# Patient Record
Sex: MALE | Race: WHITE | NOT HISPANIC OR LATINO | Employment: OTHER | URBAN - METROPOLITAN AREA
[De-identification: names, ages, dates, MRNs, and addresses within clinical notes are randomized per-mention and may not be internally consistent; named-entity substitution may affect disease eponyms.]

---

## 2021-08-17 ENCOUNTER — HISTORICAL (OUTPATIENT)
Dept: ADMINISTRATIVE | Facility: HOSPITAL | Age: 76
End: 2021-08-17

## 2021-08-18 ENCOUNTER — HISTORICAL (OUTPATIENT)
Dept: ADMINISTRATIVE | Facility: HOSPITAL | Age: 76
End: 2021-08-18

## 2021-09-06 ENCOUNTER — HISTORICAL (OUTPATIENT)
Dept: ADMINISTRATIVE | Facility: HOSPITAL | Age: 76
End: 2021-09-06

## 2021-09-22 ENCOUNTER — HISTORICAL (OUTPATIENT)
Dept: ADMINISTRATIVE | Facility: HOSPITAL | Age: 76
End: 2021-09-22

## 2021-10-11 ENCOUNTER — HISTORICAL (OUTPATIENT)
Dept: ADMINISTRATIVE | Facility: HOSPITAL | Age: 76
End: 2021-10-11

## 2021-11-09 ENCOUNTER — HISTORICAL (OUTPATIENT)
Dept: ADMINISTRATIVE | Facility: HOSPITAL | Age: 76
End: 2021-11-09

## 2022-04-07 ENCOUNTER — HISTORICAL (OUTPATIENT)
Dept: ADMINISTRATIVE | Facility: HOSPITAL | Age: 77
End: 2022-04-07

## 2022-04-24 VITALS
HEIGHT: 73 IN | BODY MASS INDEX: 29.16 KG/M2 | WEIGHT: 220 LBS | SYSTOLIC BLOOD PRESSURE: 132 MMHG | DIASTOLIC BLOOD PRESSURE: 86 MMHG

## 2022-07-12 RX ORDER — TADALAFIL 10 MG/1
10 TABLET ORAL DAILY PRN
COMMUNITY

## 2022-07-12 RX ORDER — PANTOPRAZOLE SODIUM 40 MG/1
40 TABLET, DELAYED RELEASE ORAL 2 TIMES DAILY
COMMUNITY

## 2022-07-12 RX ORDER — DICLOFENAC SODIUM 75 MG/1
75 TABLET, DELAYED RELEASE ORAL 2 TIMES DAILY
COMMUNITY

## 2022-07-12 RX ORDER — LANOLIN ALCOHOL/MO/W.PET/CERES
400 CREAM (GRAM) TOPICAL 2 TIMES DAILY
COMMUNITY

## 2022-07-12 RX ORDER — GLYBURIDE 5 MG/1
5 TABLET ORAL 2 TIMES DAILY WITH MEALS
COMMUNITY

## 2022-07-12 RX ORDER — OLMESARTAN MEDOXOMIL 40 MG/1
40 TABLET ORAL DAILY
COMMUNITY

## 2022-07-12 RX ORDER — AMLODIPINE BESYLATE 5 MG/1
5 TABLET ORAL DAILY
COMMUNITY

## 2022-07-12 RX ORDER — FOLIC ACID 0.4 MG
400 TABLET ORAL DAILY
COMMUNITY

## 2022-07-12 RX ORDER — FUROSEMIDE 20 MG/1
20 TABLET ORAL DAILY
COMMUNITY

## 2022-07-12 RX ORDER — NEBIVOLOL 10 MG/1
10 TABLET ORAL DAILY
COMMUNITY

## 2022-07-12 RX ORDER — ROSUVASTATIN CALCIUM 5 MG/1
5 TABLET, COATED ORAL DAILY
COMMUNITY

## 2022-07-12 RX ORDER — ASPIRIN 81 MG/1
81 TABLET ORAL 2 TIMES DAILY
COMMUNITY

## 2022-07-12 RX ORDER — UBIDECARENONE 30 MG
30 CAPSULE ORAL 3 TIMES DAILY
COMMUNITY

## 2022-07-12 RX ORDER — ACETAMINOPHEN 500 MG
TABLET ORAL DAILY
COMMUNITY

## 2022-07-12 RX ORDER — TESTOSTERONE CYPIONATE 200 MG/ML
INJECTION, SOLUTION INTRAMUSCULAR
COMMUNITY

## 2022-07-12 RX ORDER — ALLOPURINOL 300 MG/1
300 TABLET ORAL DAILY
COMMUNITY

## 2022-07-14 ENCOUNTER — OFFICE VISIT (OUTPATIENT)
Dept: NEUROLOGY | Facility: CLINIC | Age: 77
End: 2022-07-14
Payer: MEDICARE

## 2022-07-14 VITALS
BODY MASS INDEX: 31.68 KG/M2 | SYSTOLIC BLOOD PRESSURE: 148 MMHG | DIASTOLIC BLOOD PRESSURE: 90 MMHG | HEIGHT: 73 IN | WEIGHT: 239 LBS

## 2022-07-14 DIAGNOSIS — F09 COGNITIVE DYSFUNCTION: ICD-10-CM

## 2022-07-14 DIAGNOSIS — G91.2 IDIOPATHIC NORMAL PRESSURE HYDROCEPHALUS: ICD-10-CM

## 2022-07-14 DIAGNOSIS — G91.2 NPH (NORMAL PRESSURE HYDROCEPHALUS): Primary | ICD-10-CM

## 2022-07-14 PROCEDURE — 99213 OFFICE O/P EST LOW 20 MIN: CPT | Mod: PBBFAC | Performed by: SPECIALIST

## 2022-07-14 PROCEDURE — 99999 PR PBB SHADOW E&M-EST. PATIENT-LVL III: CPT | Mod: PBBFAC,,, | Performed by: SPECIALIST

## 2022-07-14 PROCEDURE — 99999 PR PBB SHADOW E&M-EST. PATIENT-LVL III: ICD-10-PCS | Mod: PBBFAC,,, | Performed by: SPECIALIST

## 2022-07-14 PROCEDURE — 99214 OFFICE O/P EST MOD 30 MIN: CPT | Mod: S$PBB,,, | Performed by: NURSE PRACTITIONER

## 2022-07-14 PROCEDURE — 99214 PR OFFICE/OUTPT VISIT, EST, LEVL IV, 30-39 MIN: ICD-10-PCS | Mod: S$PBB,,, | Performed by: NURSE PRACTITIONER

## 2022-07-14 RX ORDER — ACETAMINOPHEN, DIPHENHYDRAMINE HCL, PHENYLEPHRINE HCL 325; 25; 5 MG/1; MG/1; MG/1
TABLET ORAL DAILY
COMMUNITY

## 2022-07-14 NOTE — PROGRESS NOTES
Subjective:       Patient ID: Iam Montero is a 77 y.o. male.    Chief Complaint: Follow-up memory    HPI:            STM continues to be an issues for him    Shunt placement in mid 2021 with 2-3 adjustments per Dr. Moy. Last CT head was in 12/2021; patient states he felt better when shunt was set at 4 cm; asking if adjustments can be made to  shunt setting.    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  Has lost weight       ROS: as per HPI, otherwise pertinent systems review is negative          Past Medical History:   Diagnosis Date    Chronic meningitis     Cognitive disorder     Diabetes mellitus     Gait disorder     GERD (gastroesophageal reflux disease)     History of lumbar surgery     HTN (hypertension)     Hydrocephalus     08/2021    Hyperlipidemia     Ventricular premature complex        Past Surgical History:   Procedure Laterality Date    BRAIN SURGERY      shunt placed 08/2021    CERVICAL SPINE SURGERY  2007    LAMINECTOMY Right 1995    LAMINECTOMY Left 1979    VENTRICULOPERITONEAL SHUNT  08/24/2021       Family History   Problem Relation Age of Onset    Stroke Mother        Social History     Socioeconomic History    Marital status:    Tobacco Use    Smoking status: Never Smoker    Smokeless tobacco: Never Used   Substance and Sexual Activity    Alcohol use: Yes    Drug use: Never       Review of patient's allergies indicates:   Allergen Reactions    Clindamycin      Other reaction(s): Unknown    Sulfa (sulfonamide antibiotics)      Other reaction(s): Unknown    Tetracycline      Other reaction(s): Unknown    Penicillin Rash    Tobramycin Rash         Current Outpatient Medications:     allopurinoL (ZYLOPRIM) 300 MG tablet, Take 300 mg by mouth once daily., Disp: , Rfl:     amLODIPine (NORVASC) 5 MG tablet, Take 5 mg by mouth once daily., Disp: ,  "Rfl:     aspirin (ECOTRIN) 81 MG EC tablet, Take 81 mg by mouth once daily., Disp: , Rfl:     co-enzyme Q-10 30 mg capsule, Take 30 mg by mouth 3 (three) times daily., Disp: , Rfl:     diclofenac (VOLTAREN) 75 MG EC tablet, Take 75 mg by mouth 2 (two) times daily., Disp: , Rfl:     folic acid (FOLVITE) 400 MCG tablet, Take 400 mcg by mouth once daily., Disp: , Rfl:     furosemide (LASIX) 20 MG tablet, Take 20 mg by mouth 2 (two) times daily., Disp: , Rfl:     glyBURIDE (DIABETA) 5 MG tablet, Take 5 mg by mouth daily with breakfast., Disp: , Rfl:     magnesium oxide (MAG-OX) 400 mg (241.3 mg magnesium) tablet, Take 400 mg by mouth once daily., Disp: , Rfl:     nebivoloL (BYSTOLIC) 10 MG Tab, Take 10 mg by mouth once daily., Disp: , Rfl:     olmesartan (BENICAR) 40 MG tablet, Take 40 mg by mouth once daily., Disp: , Rfl:     pantoprazole (PROTONIX) 40 MG tablet, Take 40 mg by mouth once daily., Disp: , Rfl:     rosuvastatin (CRESTOR) 5 MG tablet, Take 5 mg by mouth once daily., Disp: , Rfl:     tadalafiL (CIALIS) 10 MG tablet, Take 10 mg by mouth daily as needed for Erectile Dysfunction., Disp: , Rfl:     cholecalciferol, vitamin D3, (VITAMIN D3) 50 mcg (2,000 unit) Cap capsule, Take by mouth once daily., Disp: , Rfl:     cyanocobalamin, vitamin B-12, (VITAMIN B-12) 5,000 mcg Subl, Place under the tongue once daily., Disp: , Rfl:     testosterone cypionate (DEPOTESTOTERONE CYPIONATE) 200 mg/mL injection, Inject into the muscle every 14 (fourteen) days., Disp: , Rfl:          Objective:      Exam:   /90 (BP Location: Left arm, Patient Position: Sitting, BP Method: Medium (Manual))   Ht 6' 1" (1.854 m)   Wt 108.4 kg (239 lb)   BMI 31.53 kg/m²     Physical Exam  Vitals reviewed.   Constitutional:       Appearance: Normal appearance.      Accompanied by GF of 5-6 years  HENT:      Ears:      Comments: Hearing normal   Eyes:      Extraocular Movements: Extraocular movements intact.   Cardiovascular: "      Rate and Rhythm: Normal rate and regular rhythm.   Pulmonary:      Effort: Pulmonary effort is normal.      Breath sounds: Normal breath sounds.   Musculoskeletal:         General: Normal range of motion.   Skin:     General: Skin is warm and dry.   Neurological:      General: No focal deficit present.      Mental Status: He is alert and oriented to person, place, and time.      Reflexes absent     Plantars silent  Psychiatric:         Mood and Affect: Mood normal.         Behavior: Behavior normal.       Dr. Henriquez physically present in exam room during patient encounter.     Assessment/Plan:     Problem List Items Addressed This Visit        Neuro    NPH (normal pressure hydrocephalus) - Primary    Current Assessment & Plan     Will get CT head w/o contrast  FU in 1 year          Cognitive dysfunction    Current Assessment & Plan     CT head w/o contrast  Do not rec adding med such as Memantine and or Aricept                 Ramez Gaytan, MSN, APRN, AGACNP-BC

## 2023-07-13 ENCOUNTER — OFFICE VISIT (OUTPATIENT)
Dept: NEUROLOGY | Facility: CLINIC | Age: 78
End: 2023-07-13
Payer: MEDICARE

## 2023-07-13 VITALS
WEIGHT: 239 LBS | DIASTOLIC BLOOD PRESSURE: 100 MMHG | BODY MASS INDEX: 31.68 KG/M2 | SYSTOLIC BLOOD PRESSURE: 168 MMHG | HEIGHT: 73 IN

## 2023-07-13 DIAGNOSIS — G91.2 NPH (NORMAL PRESSURE HYDROCEPHALUS): Primary | ICD-10-CM

## 2023-07-13 PROCEDURE — 99999 PR PBB SHADOW E&M-EST. PATIENT-LVL IV: CPT | Mod: PBBFAC,,, | Performed by: NURSE PRACTITIONER

## 2023-07-13 PROCEDURE — 99214 PR OFFICE/OUTPT VISIT, EST, LEVL IV, 30-39 MIN: ICD-10-PCS | Mod: S$PBB,,, | Performed by: NURSE PRACTITIONER

## 2023-07-13 PROCEDURE — 99214 OFFICE O/P EST MOD 30 MIN: CPT | Mod: PBBFAC | Performed by: NURSE PRACTITIONER

## 2023-07-13 PROCEDURE — 99999 PR PBB SHADOW E&M-EST. PATIENT-LVL IV: ICD-10-PCS | Mod: PBBFAC,,, | Performed by: NURSE PRACTITIONER

## 2023-07-13 PROCEDURE — 99214 OFFICE O/P EST MOD 30 MIN: CPT | Mod: S$PBB,,, | Performed by: NURSE PRACTITIONER

## 2023-07-13 RX ORDER — FOLIC ACID 0.8 MG
800 TABLET ORAL DAILY
COMMUNITY

## 2023-07-13 NOTE — PROGRESS NOTES
Neurology Follow up Note    Subjective:         Patient ID: Iam Montero is a 78 y.o. male.    Chief Complaint: F/U  Memory-Unsteady Gait.      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 Pt ambulates w a cane.  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 He does not use an AFO. He denies falls.    Bladder stable; denies incontinence    ROS: as per HPI, otherwise pertinent systems review is negative          Past Medical History:   Diagnosis Date    Chronic meningitis     Cognitive disorder     Diabetes mellitus     Gait disorder     GERD (gastroesophageal reflux disease)     History of lumbar surgery     HTN (hypertension)     Hydrocephalus     08/2021    Hyperlipidemia     Ventricular premature complex        Past Surgical History:   Procedure Laterality Date    BRAIN SURGERY      shunt placed 08/2021    CERVICAL SPINE SURGERY  2007    LAMINECTOMY Right 1995    LAMINECTOMY Left 1979    VENTRICULOPERITONEAL SHUNT  08/24/2021       Family History   Problem Relation Age of Onset    Stroke Mother        Social History     Socioeconomic History    Marital status:    Tobacco Use    Smoking status: Never    Smokeless tobacco: Never   Substance and Sexual Activity    Alcohol use: Yes    Drug use: Never       Review of patient's allergies indicates:   Allergen Reactions    Clindamycin      Other reaction(s): Unknown    Sulfa (sulfonamide antibiotics)      Other reaction(s): Unknown    Tetracycline      Other reaction(s): Unknown    Penicillin Rash    Tobramycin Rash       Current Outpatient Medications   Medication Instructions    allopurinoL (ZYLOPRIM) 300 mg,  "Oral, Daily    amLODIPine (NORVASC) 5 mg, Oral, Daily    aspirin (ECOTRIN) 81 mg, Oral, 2 times daily    cholecalciferol, vitamin D3, (VITAMIN D3) 50 mcg (2,000 unit) Cap capsule Oral, Daily    co-enzyme Q-10 30 mg, Oral, 3 times daily    cyanocobalamin, vitamin B-12, 5,000 mcg Subl Sublingual, Daily    diclofenac (VOLTAREN) 75 mg, Oral, 2 times daily    folic acid (FOLVITE) 400 mcg, Oral, Daily    folic acid (FOLVITE) 800 mcg, Oral, Daily    furosemide (LASIX) 20 mg, Oral, Daily    glyBURIDE (DIABETA) 5 mg, Oral, 2 times daily with meals    magnesium oxide (MAG-OX) 400 mg, Oral, 2 times daily    nebivoloL (BYSTOLIC) 10 mg, Oral, Daily    olmesartan (BENICAR) 40 mg, Oral, Daily    pantoprazole (PROTONIX) 40 mg, Oral, 2 times daily    rosuvastatin (CRESTOR) 5 mg, Oral, Daily    tadalafiL (CIALIS) 10 mg, Oral, Daily PRN    testosterone cypionate (DEPOTESTOTERONE CYPIONATE) 200 mg/mL injection Intramuscular, Every 14 days       Objective:      Exam:   Visit Vitals  BP (!) 168/100   Ht 6' 1" (1.854 m)   Wt 108.4 kg (239 lb)   BMI 31.53 kg/m²       Physical Exam  Vitals reviewed.   Constitutional:       Appearance: Normal appearance.      Accompanied by GF of 5-6 years  HENT:      Ears:      Comments: Hearing normal   Eyes:      Extraocular Movements: Extraocular movements intact.      Funduscopic exam nml - prior cataract surgery      PERRLA  Cardiovascular:      Rate and Rhythm: Normal rate and regular rhythm.   Pulmonary:      Effort: Pulmonary effort is normal.      Breath sounds: Normal breath sounds.   Musculoskeletal:         General: Normal range of motion.   Skin:     General: Skin is warm and dry.   Neurological:      General: No focal deficit present.      Mental Status: He is alert and oriented to person, place, and time.      Reflexes absent     Plantars silent     R DF 3/5     Vib nml     Floppy R ankle     Gait unassisted [did not use the cane]; steppage gait R foot; turn en bloc  Psychiatric:         Mood " and Affect: Mood normal.         Behavior: Behavior normal.       Dr. Henriquez physically present in exam room during patient encounter.         Assessment/Plan:   1. NPH (normal pressure hydrocephalus)  Continue present management  Fall risk discussed    Ask that he call us if needed to come back in the future; no FU requested at this time - he agreed    Driving discussed    Repeat brain imaging not warranted at this time being he has remained stable. His CT head 07/2022 was static stable w/o hydrocephalus and a properly functioning shunt.       Follow up if symptoms worsen or fail to improve.      Ramez Gaytan, MSN, APRN, AGACNP-BC

## 2025-07-15 ENCOUNTER — HOSPITAL ENCOUNTER (INPATIENT)
Facility: HOSPITAL | Age: 80
LOS: 3 days | Discharge: HOME | DRG: 065 | End: 2025-07-18
Attending: EMERGENCY MEDICINE | Admitting: STUDENT IN AN ORGANIZED HEALTH CARE EDUCATION/TRAINING PROGRAM
Payer: MEDICARE

## 2025-07-15 DIAGNOSIS — I10 HYPERTENSION, UNSPECIFIED TYPE: ICD-10-CM

## 2025-07-15 DIAGNOSIS — R07.9 CHEST PAIN: ICD-10-CM

## 2025-07-15 DIAGNOSIS — I61.9 INTRAPARENCHYMAL HEMORRHAGE OF BRAIN: ICD-10-CM

## 2025-07-15 DIAGNOSIS — I61.5 IVH (INTRAVENTRICULAR HEMORRHAGE): Primary | ICD-10-CM

## 2025-07-15 LAB
ALBUMIN SERPL-MCNC: 3.8 G/DL (ref 3.4–4.8)
ALBUMIN/GLOB SERPL: 1.1 RATIO (ref 1.1–2)
ALP SERPL-CCNC: 95 UNIT/L (ref 40–150)
ALT SERPL-CCNC: 23 UNIT/L (ref 0–55)
ANION GAP SERPL CALC-SCNC: 9 MEQ/L
APTT PPP: 26.4 SECONDS (ref 23.2–33.7)
AST SERPL-CCNC: 14 UNIT/L (ref 11–45)
BASOPHILS # BLD AUTO: 0.02 X10(3)/MCL
BASOPHILS NFR BLD AUTO: 0.2 %
BILIRUB SERPL-MCNC: 0.5 MG/DL
BUN SERPL-MCNC: 12.6 MG/DL (ref 8.4–25.7)
CALCIUM SERPL-MCNC: 8.7 MG/DL (ref 8.8–10)
CHLORIDE SERPL-SCNC: 106 MMOL/L (ref 98–107)
CO2 SERPL-SCNC: 25 MMOL/L (ref 23–31)
CREAT SERPL-MCNC: 0.75 MG/DL (ref 0.72–1.25)
CREAT/UREA NIT SERPL: 17
EOSINOPHIL # BLD AUTO: 0.05 X10(3)/MCL (ref 0–0.9)
EOSINOPHIL NFR BLD AUTO: 0.5 %
ERYTHROCYTE [DISTWIDTH] IN BLOOD BY AUTOMATED COUNT: 17.1 % (ref 11.5–17)
GFR SERPLBLD CREATININE-BSD FMLA CKD-EPI: >60 ML/MIN/1.73/M2
GLOBULIN SER-MCNC: 3.4 GM/DL (ref 2.4–3.5)
GLUCOSE SERPL-MCNC: 163 MG/DL (ref 82–115)
HCT VFR BLD AUTO: 47.4 % (ref 42–52)
HGB BLD-MCNC: 15.1 G/DL (ref 14–18)
IMM GRANULOCYTES # BLD AUTO: 0.06 X10(3)/MCL (ref 0–0.04)
IMM GRANULOCYTES NFR BLD AUTO: 0.6 %
INR PPP: 1
LYMPHOCYTES # BLD AUTO: 2.13 X10(3)/MCL (ref 0.6–4.6)
LYMPHOCYTES NFR BLD AUTO: 20.2 %
MCH RBC QN AUTO: 27.1 PG (ref 27–31)
MCHC RBC AUTO-ENTMCNC: 31.9 G/DL (ref 33–36)
MCV RBC AUTO: 84.9 FL (ref 80–94)
MONOCYTES # BLD AUTO: 0.91 X10(3)/MCL (ref 0.1–1.3)
MONOCYTES NFR BLD AUTO: 8.6 %
NEUTROPHILS # BLD AUTO: 7.4 X10(3)/MCL (ref 2.1–9.2)
NEUTROPHILS NFR BLD AUTO: 69.9 %
NRBC BLD AUTO-RTO: 0 %
PLATELET # BLD AUTO: 262 X10(3)/MCL (ref 130–400)
PMV BLD AUTO: 9.7 FL (ref 7.4–10.4)
POCT GLUCOSE: 193 MG/DL (ref 70–110)
POTASSIUM SERPL-SCNC: 3.6 MMOL/L (ref 3.5–5.1)
PROT SERPL-MCNC: 7.2 GM/DL (ref 5.8–7.6)
PROTHROMBIN TIME: 12.9 SECONDS (ref 12.5–14.5)
RBC # BLD AUTO: 5.58 X10(6)/MCL (ref 4.7–6.1)
SODIUM SERPL-SCNC: 140 MMOL/L (ref 136–145)
WBC # BLD AUTO: 10.57 X10(3)/MCL (ref 4.5–11.5)

## 2025-07-15 PROCEDURE — 25500020 PHARM REV CODE 255: Performed by: STUDENT IN AN ORGANIZED HEALTH CARE EDUCATION/TRAINING PROGRAM

## 2025-07-15 PROCEDURE — 85025 COMPLETE CBC W/AUTO DIFF WBC: CPT | Performed by: EMERGENCY MEDICINE

## 2025-07-15 PROCEDURE — 25000003 PHARM REV CODE 250: Performed by: STUDENT IN AN ORGANIZED HEALTH CARE EDUCATION/TRAINING PROGRAM

## 2025-07-15 PROCEDURE — 63600175 PHARM REV CODE 636 W HCPCS: Mod: JZ,TB | Performed by: EMERGENCY MEDICINE

## 2025-07-15 PROCEDURE — 96365 THER/PROPH/DIAG IV INF INIT: CPT

## 2025-07-15 PROCEDURE — 80053 COMPREHEN METABOLIC PANEL: CPT | Performed by: EMERGENCY MEDICINE

## 2025-07-15 PROCEDURE — 63600175 PHARM REV CODE 636 W HCPCS

## 2025-07-15 PROCEDURE — 63600175 PHARM REV CODE 636 W HCPCS: Performed by: STUDENT IN AN ORGANIZED HEALTH CARE EDUCATION/TRAINING PROGRAM

## 2025-07-15 PROCEDURE — 99223 1ST HOSP IP/OBS HIGH 75: CPT | Mod: FS,,, | Performed by: STUDENT IN AN ORGANIZED HEALTH CARE EDUCATION/TRAINING PROGRAM

## 2025-07-15 PROCEDURE — 85730 THROMBOPLASTIN TIME PARTIAL: CPT | Performed by: EMERGENCY MEDICINE

## 2025-07-15 PROCEDURE — C9248 INJ, CLEVIDIPINE BUTYRATE: HCPCS | Mod: JZ,TB | Performed by: EMERGENCY MEDICINE

## 2025-07-15 PROCEDURE — 85610 PROTHROMBIN TIME: CPT | Performed by: EMERGENCY MEDICINE

## 2025-07-15 PROCEDURE — 99285 EMERGENCY DEPT VISIT HI MDM: CPT | Mod: 25

## 2025-07-15 PROCEDURE — 20000000 HC ICU ROOM

## 2025-07-15 RX ORDER — GLUCAGON 1 MG
1 KIT INJECTION
Status: DISCONTINUED | OUTPATIENT
Start: 2025-07-15 | End: 2025-07-18 | Stop reason: HOSPADM

## 2025-07-15 RX ORDER — LEVETIRACETAM 500 MG/1
500 TABLET ORAL 2 TIMES DAILY
Status: DISCONTINUED | OUTPATIENT
Start: 2025-07-15 | End: 2025-07-18 | Stop reason: HOSPADM

## 2025-07-15 RX ORDER — MUPIROCIN 20 MG/G
OINTMENT TOPICAL 2 TIMES DAILY
Status: DISCONTINUED | OUTPATIENT
Start: 2025-07-15 | End: 2025-07-18 | Stop reason: HOSPADM

## 2025-07-15 RX ORDER — LABETALOL HYDROCHLORIDE 5 MG/ML
20 INJECTION, SOLUTION INTRAVENOUS EVERY 6 HOURS PRN
Status: DISCONTINUED | OUTPATIENT
Start: 2025-07-15 | End: 2025-07-18 | Stop reason: HOSPADM

## 2025-07-15 RX ORDER — INSULIN ASPART 100 [IU]/ML
0-10 INJECTION, SOLUTION INTRAVENOUS; SUBCUTANEOUS
Status: DISCONTINUED | OUTPATIENT
Start: 2025-07-15 | End: 2025-07-18 | Stop reason: HOSPADM

## 2025-07-15 RX ORDER — FOLIC ACID 0.8 MG
800 TABLET ORAL DAILY
COMMUNITY

## 2025-07-15 RX ORDER — IBUPROFEN 200 MG
16 TABLET ORAL
Status: DISCONTINUED | OUTPATIENT
Start: 2025-07-15 | End: 2025-07-18 | Stop reason: HOSPADM

## 2025-07-15 RX ORDER — HYDRALAZINE HYDROCHLORIDE 20 MG/ML
10 INJECTION INTRAMUSCULAR; INTRAVENOUS EVERY 6 HOURS PRN
Status: DISCONTINUED | OUTPATIENT
Start: 2025-07-15 | End: 2025-07-18 | Stop reason: HOSPADM

## 2025-07-15 RX ORDER — IBUPROFEN 200 MG
24 TABLET ORAL
Status: DISCONTINUED | OUTPATIENT
Start: 2025-07-15 | End: 2025-07-18 | Stop reason: HOSPADM

## 2025-07-15 RX ORDER — AMLODIPINE BESYLATE 5 MG/1
5 TABLET ORAL DAILY
Status: DISCONTINUED | OUTPATIENT
Start: 2025-07-16 | End: 2025-07-18 | Stop reason: HOSPADM

## 2025-07-15 RX ADMIN — LEVETIRACETAM 500 MG: 500 TABLET, FILM COATED ORAL at 08:07

## 2025-07-15 RX ADMIN — CLEVIPIDINE 2 MG/HR: 0.5 EMULSION INTRAVENOUS at 01:07

## 2025-07-15 RX ADMIN — IOHEXOL 100 ML: 350 INJECTION, SOLUTION INTRAVENOUS at 03:07

## 2025-07-15 RX ADMIN — MUPIROCIN: 20 OINTMENT TOPICAL at 08:07

## 2025-07-15 RX ADMIN — INSULIN ASPART 1 UNITS: 100 INJECTION, SOLUTION INTRAVENOUS; SUBCUTANEOUS at 11:07

## 2025-07-15 RX ADMIN — HYDRALAZINE HYDROCHLORIDE 10 MG: 20 INJECTION INTRAMUSCULAR; INTRAVENOUS at 05:07

## 2025-07-15 NOTE — ED PROVIDER NOTES
"Encounter Date: 7/15/2025    SCRIBE #1 NOTE: I, Alma Rosa Perea, am scribing for, and in the presence of,  Walter Camilo MD. I have scribed the following portions of the note - Other sections scribed: HPI, ROS, PE.       History     Chief Complaint   Patient presents with    Transfer     Transfer from [facility] for neurosx/left lateral ventricle temporal bleed with 2 mm shift. GCS 14 on arrival.      The patient is a 80-year-old male with a history of chronic meningitis, DM, GERD, HTN, hydrocephalus status post  shunt, and HLD [facility]. The patient complains of confusion and fatigue; he denies headaches or visual disturbances. The patient reports that he began "not feeling right" around 3 or 4 days ago, felt increasingly disoriented last night, and felt fatigued this morning, so he visited the ED; he notes that he feels like he's been unable to care for himself recently. The patient endorses issues with his memory and finding words when speaking. The patient denies recent falls, and he is not on blood thinners.     The history is provided by the patient. No  was used.     Review of patient's allergies indicates:   Allergen Reactions    Clindamycin      Other reaction(s): Unknown    Sulfa (sulfonamide antibiotics)      Other reaction(s): Unknown    Tetracycline      Other reaction(s): Unknown    Penicillin Rash    Tobramycin Rash     Past Medical History:   Diagnosis Date    Chronic meningitis     Cognitive disorder     Diabetes mellitus     Gait disorder     GERD (gastroesophageal reflux disease)     History of lumbar surgery     HTN (hypertension)     Hydrocephalus     08/2021    Hyperlipidemia     Ventricular premature complex      Past Surgical History:   Procedure Laterality Date    BRAIN SURGERY      shunt placed 08/2021    CERVICAL SPINE SURGERY  2007    LAMINECTOMY Right 1995    LAMINECTOMY Left " 1979    VENTRICULOPERITONEAL SHUNT  08/24/2021     Family History   Problem Relation Name Age of Onset    Stroke Mother       Social History[1]  Review of Systems   Constitutional:  Positive for fatigue. Negative for chills and fever.   HENT:  Negative for congestion and sore throat.    Eyes:  Negative for visual disturbance.   Respiratory:  Negative for cough and shortness of breath.    Cardiovascular:  Negative for chest pain.   Gastrointestinal:  Negative for abdominal pain, diarrhea, nausea and vomiting.   Genitourinary:  Negative for dysuria.   Musculoskeletal:  Negative for myalgias.   Skin:  Negative for rash.   Neurological:  Negative for weakness, numbness and headaches.   Psychiatric/Behavioral:  Positive for confusion.    All other systems reviewed and are negative.      Physical Exam     Initial Vitals [07/15/25 1303]   BP Pulse Resp Temp SpO2   (!) 166/87 66 18 98.3 °F (36.8 °C) 98 %      MAP       --         Physical Exam    Nursing note and vitals reviewed.  Constitutional: He appears well-developed and well-nourished.   HENT:   Head: Normocephalic and atraumatic. Mouth/Throat: Oropharynx is clear and moist.    shunt to right posterior, parietal scalp.    Eyes: Pupils are equal, round, and reactive to light.   Neck: Neck supple.   Normal range of motion.  Cardiovascular:  Normal rate, regular rhythm, normal heart sounds and intact distal pulses.           Pulmonary/Chest: Breath sounds normal. No respiratory distress.   Abdominal: Abdomen is soft. Bowel sounds are normal. There is no abdominal tenderness. There is no rebound and no guarding.   Musculoskeletal:         General: No tenderness or edema. Normal range of motion.      Cervical back: Normal range of motion and neck supple.     Neurological: He is alert and oriented to person, place, and time. He has normal strength. No sensory deficit. GCS score is 15. GCS eye subscore is 4. GCS verbal subscore is 5. GCS motor subscore is 6.   Mild  amnesia; difficulty finding words at times; right foot drop.    Skin: Skin is warm and dry. Capillary refill takes less than 2 seconds.   Psychiatric: He has a normal mood and affect.         ED Course   Procedures  Labs Reviewed   COMPREHENSIVE METABOLIC PANEL - Abnormal       Result Value    Sodium 140      Potassium 3.6      Chloride 106      CO2 25      Glucose 163 (*)     Blood Urea Nitrogen 12.6      Creatinine 0.75      Calcium 8.7 (*)     Protein Total 7.2      Albumin 3.8      Globulin 3.4      Albumin/Globulin Ratio 1.1      Bilirubin Total 0.5      ALP 95      ALT 23      AST 14      eGFR >60      Anion Gap 9.0      BUN/Creatinine Ratio 17     CBC WITH DIFFERENTIAL - Abnormal    WBC 10.57      RBC 5.58      Hgb 15.1      Hct 47.4      MCV 84.9      MCH 27.1      MCHC 31.9 (*)     RDW 17.1 (*)     Platelet 262      MPV 9.7      Neut % 69.9      Lymph % 20.2      Mono % 8.6      Eos % 0.5      Basophil % 0.2      Imm Grans % 0.6      Neut # 7.40      Lymph # 2.13      Mono # 0.91      Eos # 0.05      Baso # 0.02      Imm Gran # 0.06 (*)     NRBC% 0.0     PROTIME-INR - Normal    PT 12.9      INR 1.0      Narrative:     Protimes are used to monitor anticoagulant agents such as warfarin. PT INR values are based on the current patient normal mean and the DILEEP value for the specific instrument reagent used.  **Routine theraputic target values for the INR are 2.0-3.0**   APTT - Normal    PTT 26.4     CBC W/ AUTO DIFFERENTIAL    Narrative:     The following orders were created for panel order CBC auto differential.  Procedure                               Abnormality         Status                     ---------                               -----------         ------                     CBC with Differential[[id number]]       Abnormal            Final result                 Please view results for these tests on the individual orders.          Imaging Results    None          Medications   clevidipine (CLEVIPREX)  25 mg/50 mL infusion (4 mg/hr Intravenous Rate/Dose Change 7/15/25 1351)     Medical Decision Making  The differential diagnosis includes, but is not limited to, IVH, ICH, obstructive hydrocephalus, and hemorrhagic CVA.         Amount and/or Complexity of Data Reviewed  External Data Reviewed: radiology and notes.     Details: I personally reviewed the patient's outside CT scan showing small left lateral horn IV  Labs: ordered. Decision-making details documented in ED Course.  Discussion of management or test interpretation with external provider(s): Neurosurgery consulted  Discussed with ICU who accepts for admission    Risk  Decision regarding hospitalization.    Critical Care  Total time providing critical care: 25 minutes            Scribe Attestation:   Scribe #1: I performed the above scribed service and the documentation accurately describes the services I performed. I attest to the accuracy of the note.    Attending Attestation:           Physician Attestation for Scribe:  Physician Attestation Statement for Scribe #1: I, Walter Camilo MD, reviewed documentation, as scribed by Alma Rosa Perea in my presence, and it is both accurate and complete.             ED Course as of 07/15/25 1422   Tue Jul 15, 2025   1328 Patient primarily with memory issues difficulty finding words.  Patient's blood pressure is in the ED very high, repeat pressure was 190 so will start Cleviprex.    Neurosurgery consulted [MP]   1343 Paged Dr. Mcmahan.  [GW]   1412 Discussed with Dr. Mcmahan.  Patient has a  shunt in place, so can be admitted to the ICU for blood pressure control, recommends systolic under 160, neuro checks for repeat CT in a [MP]   1413 Discussed with ICU who accepts for admission [MP]   1413 Paged ICU. [GW]      ED Course User Index  [GW] Alma Rosa Perea  [MP] Walter Camilo MD                               Clinical Impression:  Final diagnoses:  [I61.5] IVH (intraventricular hemorrhage) (Primary)  [I10]  Hypertension, unspecified type          ED Disposition Condition    Admit                         [1]   Social History  Tobacco Use    Smoking status: Never    Smokeless tobacco: Never   Substance Use Topics    Alcohol use: Yes    Drug use: Never        Walter Camilo MD  07/15/25 1421

## 2025-07-15 NOTE — ED NOTES
Bed: 29  Expected date: 7/15/25  Expected time:   Means of arrival:   Comments:  NATO borden   guidewire removed.

## 2025-07-15 NOTE — CONSULTS
"[facility] - Emergency Dept  Neurosurgery  Consult Note    Inpatient consult to Neurosurgery  Consult performed by: Natasha Reynoso PA  Consult ordered by: Walter Camilo MD        Subjective:     Chief Complaint/Reason for Admission: [facility]    History of Present Illness: Patient is a 80-year-old male with a history of chronic meningitis, DM, GERD, HTN, hydrocephalus status post  shunt, and HLD, who presented to the ED as a transfer from an outlying facility for a left lateral ventricle temporal bleed. The patient complains of confusion and fatigue; he denies headaches or visual disturbances. The patient reports that he began "not feeling right" around 3 or 4 days ago, felt increasingly disoriented last night, and felt fatigued this morning, so he visited the ED; he notes that he feels like he's been unable to care for himself recently. The patient endorses issues with his memory and finding words when speaking. The patient denies recent falls, and he is not on blood thinners.     On PE in the ED he is sitting up in bed, NAD. He is alert and oriented but does get somewhat confused in conversation. 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 He denies falls but family at bedside states that he did have xrays of his shoulder about 3 weeks ago which she believes was due to a fall. He currently denies HA, blurred vision and N/V. He reports chronic numbness in bilateral LE following 5 back surgeries with chronic right foot drop. He also reports placement of  shunt by Dr. Moy 4 years ago following CSF leak from his lumbar procedures. He has had no issues following shunt placement.    Review of patient's allergies indicates:   Allergen Reactions    Clindamycin      Other reaction(s): Unknown    Sulfa (sulfonamide antibiotics)      Other reaction(s): Unknown    Tetracycline      Other reaction(s): Unknown    Penicillin Rash    Tobramycin Rash       Past " Medical History:   Diagnosis Date    Chronic meningitis     Cognitive disorder     Diabetes mellitus     Gait disorder     GERD (gastroesophageal reflux disease)     History of lumbar surgery     HTN (hypertension)     Hydrocephalus     08/2021    Hyperlipidemia     Ventricular premature complex      Past Surgical History:   Procedure Laterality Date    BRAIN SURGERY      shunt placed 08/2021    CERVICAL SPINE SURGERY  2007    LAMINECTOMY Right 1995    LAMINECTOMY Left 1979    VENTRICULOPERITONEAL SHUNT  08/24/2021     Family History       Problem Relation (Age of Onset)    Stroke Mother          Tobacco Use    Smoking status: Never    Smokeless tobacco: Never   Substance and Sexual Activity    Alcohol use: Yes    Drug use: Never    Sexual activity: Not on file     Review of Systems  Objective:     Weight: 90.7 kg (200 lb)  Body mass index is 27.12 kg/m².  Vital Signs (Most Recent):  Temp: 98.3 °F (36.8 °C) (07/15/25 1303)  Pulse: 73 (07/15/25 1430)  Resp: 17 (07/15/25 1430)  BP: (!) 142/77 (07/15/25 1430)  SpO2: 96 % (07/15/25 1430) Vital Signs (24h Range):  Temp:  [98.3 °F (36.8 °C)] 98.3 °F (36.8 °C)  Pulse:  [65-75] 73  Resp:  [16-25] 17  SpO2:  [93 %-98 %] 96 %  BP: (118-190)/() 142/77     Physical Exam:  Nursing note and vitals reviewed.    Constitutional: He appears well-developed and well-nourished.     Eyes: Pupils are equal, round, and reactive to light. EOM are normal.     Cardiovascular: Intact distal pulses.     Abdominal: Soft.     Skin:   Well healed shunt scar on the right scalp.     Psych/Behavior: He is alert. He is oriented to person, place, and time. He has a normal mood and affect.   He does get confused in conversation at times.     Musculoskeletal:        Neck: Range of motion is full. There is no tenderness.        Back: Range of motion is full. There is no tenderness.        Right Upper Extremities: Range of motion is full. Muscle strength is 5/5.        Left Upper Extremities: Range  "of motion is full. Muscle strength is 5/5.        Left Lower Extremities: Range of motion is full. Muscle strength is 5/5.      Comments: Right foot drop, chronic.     Neurological:        Sensory: There is no sensory deficit in the trunk. There is sensory deficit in the extremities. Sensory deficit is located Chronic numbness in bilateral LE.        Cranial nerves: Cranial nerve(s) II, III, IV, V, VI, VII, VIII, IX, X, XI and XII are intact.     Neg pronator drift  Neg clonus    Significant Labs:  Recent Labs   Lab 07/15/25  1344   *      K 3.6      CO2 25   BUN 12.6   CREATININE 0.75   CALCIUM 8.7*     Recent Labs   Lab 07/15/25  1344   WBC 10.57   HGB 15.1   HCT 47.4        Recent Labs   Lab 07/15/25  1344   INR 1.0   APTT 26.4     Microbiology Results (last 7 days)       ** No results found for the last 168 hours. **          Significant Diagnostics:    Assessment/Plan:     IPH  AMS  Confusion    He is currently GCS 15 with no c/o HA. He does have confusion in conversation and reports 3 day history of feeling "off."  CT reviewed; no plans for surgical intervention on his left IPH  We have ordered CTA head and neck and MRI brain to further assess  OK for floor with Q2 hour neuro exams if BP controlled; if not, ICU for gtt   Continue BP parameters below 160/90  Keppra BID  SCDs for DVT prophylaxis  Further recs per Dr. Mcmahan once imaging complete    Thank you for your consult. I will follow-up with patient. Please contact us if you have any additional questions.    HEMANTH Bettencourt  Neurosurgery  [facility] - Emergency Dept    "

## 2025-07-15 NOTE — PROGRESS NOTES
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 Moy and Dr. Yi), and HLD who presented to [facility] ED as a transfer from an OSH for a left lateral ventricle temporal bleed.     The patient complains of confusion and fatigue and "not feeling right" for the past 3 or 4 days ago. Has felt increasingly disoriented so went to the ED. CT Head showed L lateral ventricle temporal bleed so was transferred for neurosurgery services.     Hospital Course/Significant events:  7/15/25 - Admit to ICU    24 Hour Interval History:  On my evaluation, he is awake and alert.  He has no focal neurologic deficits.  He is confused and says that he presented to the hospital for back pain and a surgery.    Past Medical History:   Diagnosis Date    Chronic meningitis     Cognitive disorder     Diabetes mellitus     Gait disorder     GERD (gastroesophageal reflux disease)     History of lumbar surgery     HTN (hypertension)     Hydrocephalus     08/2021    Hyperlipidemia     Ventricular premature complex        Past Surgical History:   Procedure Laterality Date    BRAIN SURGERY      shunt placed 08/2021    CERVICAL SPINE SURGERY  2007    LAMINECTOMY Right 1995    LAMINECTOMY Left 1979    VENTRICULOPERITONEAL SHUNT  08/24/2021       Social History[1]        Current Outpatient Medications   Medication Instructions    allopurinoL (ZYLOPRIM) 300 mg, Oral, Daily    amLODIPine (NORVASC) 5 mg, Oral, Daily    aspirin (ECOTRIN) 81 mg, Oral, 2 times daily    co-enzyme Q-10 30 mg, Oral, 3 times daily    folic acid (FOLVITE) 800 mcg, Oral, Daily    " glyBURIDE (DIABETA) 5 mg, Oral, 2 times daily with meals, 1.5 tablets (7.5 mg) morning and night; 1 tablet (5mg) at noon    magnesium oxide (MAG-OX) 400 mg, Oral, 2 times daily    nebivoloL (BYSTOLIC) 10 mg, Oral, Daily    olmesartan (BENICAR) 40 mg, Oral, Daily    pantoprazole (PROTONIX) 80 mg, Oral, Daily    rosuvastatin (CRESTOR) 5 mg, Oral, Daily    tadalafiL (CIALIS) 10 mg, Oral, Daily PRN    testosterone cypionate (DEPOTESTOTERONE CYPIONATE) 200 mg/mL injection Intramuscular, Every 14 days       Review of patient's allergies indicates:   Allergen Reactions    Clindamycin      Other reaction(s): Unknown    Sulfa (sulfonamide antibiotics)      Other reaction(s): Unknown    Tetracycline      Other reaction(s): Unknown    Penicillin Rash    Tobramycin Rash        Current Inpatient Medications   mupirocin   Nasal BID       Current Intravenous Infusions   clevidipine  0-16 mg/hr Intravenous Continuous 6 mL/hr at 07/15/25 1521 3 mg/hr at 07/15/25 1521         ROS       Objective:     No intake or output data in the 24 hours ending 07/15/25 1618      Vital Signs (Most Recent):  Temp: 98.3 °F (36.8 °C) (07/15/25 1303)  Pulse: 73 (07/15/25 1430)  Resp: 17 (07/15/25 1430)  BP: (!) 142/77 (07/15/25 1430)  SpO2: 96 % (07/15/25 1430)  Body mass index is 27.12 kg/m².  Weight: 90.7 kg (200 lb) Vital Signs (24h Range):  Temp:  [98.3 °F (36.8 °C)] 98.3 °F (36.8 °C)  Pulse:  [65-75] 73  Resp:  [16-25] 17  SpO2:  [93 %-98 %] 96 %  BP: (118-190)/() 142/77     Physical Exam  Vitals reviewed.   Constitutional:       General: He is not in acute distress.  Cardiovascular:      Rate and Rhythm: Normal rate and regular rhythm.   Pulmonary:      Effort: Pulmonary effort is normal. No respiratory distress.      Breath sounds: No wheezing or rales.   Abdominal:      General: Abdomen is flat.      Palpations: Abdomen is soft.   Musculoskeletal:      Right lower leg: No edema.      Left lower leg: No edema.   Neurological:       General: No focal deficit present.      Mental Status: He is alert.      Comments: Alert and oriented.  Answers some questions inappropriately.  No focal neurologic deficits.  5/5 strength and sensation intact throughout           Lines/Drains/Airways       Peripheral Intravenous Line  Duration             Peripheral IV 07/15/25 1341 20 G Left Antecubital <1 day    Peripheral IV 07/15/25 1341 20 G Left;Posterior Hand <1 day                    Significant Labs:    Lab Results   Component Value Date    WBC 10.57 07/15/2025    HGB 15.1 07/15/2025    HCT 47.4 07/15/2025    MCV 84.9 07/15/2025     07/15/2025           BMP  Lab Results   Component Value Date     07/15/2025    K 3.6 07/15/2025    CO2 25 07/15/2025    BUN 12.6 07/15/2025    CREATININE 0.75 07/15/2025    CALCIUM 8.7 (L) 07/15/2025    AGAP 9.0 07/15/2025       Significant Imaging:  I have reviewed the pertinent imaging within the past 24 hours.        Assessment/Plan:     Assessment  Intraparenchymal hemorrhage  Encephalopathy  Hypertension  T2DM      Plan  Admit to ICU  Q.2h neuro checks  Blood pressure goals less than 160/90  Keppra b.i.d. for prophylaxis  Appreciate Neurosurgery recommendations  Sliding scale insulin    DVT Prophylaxis: SCD  GI Prophylaxis: none        Tyler Nuñez MD  Pulmonary Critical Care Medicine  [facility] - Emergency Dept  DOS: 07/15/2025          [1]   Social History  Socioeconomic History    Marital status:    Tobacco Use    Smoking status: Never    Smokeless tobacco: Never   Substance and Sexual Activity    Alcohol use: Yes    Drug use: Never

## 2025-07-16 PROBLEM — I61.9 INTRAPARENCHYMAL HEMORRHAGE OF BRAIN: Status: ACTIVE | Noted: 2025-07-16

## 2025-07-16 PROBLEM — R41.3 MEMORY DEFICIT: Status: ACTIVE | Noted: 2025-07-16

## 2025-07-16 LAB
CREAT SERPL-MCNC: 0.78 MG/DL (ref 0.72–1.25)
GFR SERPLBLD CREATININE-BSD FMLA CKD-EPI: >60 ML/MIN/1.73/M2
POCT GLUCOSE: 188 MG/DL (ref 70–110)
POCT GLUCOSE: 192 MG/DL (ref 70–110)
POCT GLUCOSE: 197 MG/DL (ref 70–110)
POCT GLUCOSE: 224 MG/DL (ref 70–110)

## 2025-07-16 PROCEDURE — 36415 COLL VENOUS BLD VENIPUNCTURE: CPT

## 2025-07-16 PROCEDURE — 25500020 PHARM REV CODE 255

## 2025-07-16 PROCEDURE — 11000001 HC ACUTE MED/SURG PRIVATE ROOM

## 2025-07-16 PROCEDURE — 63600175 PHARM REV CODE 636 W HCPCS: Performed by: STUDENT IN AN ORGANIZED HEALTH CARE EDUCATION/TRAINING PROGRAM

## 2025-07-16 PROCEDURE — 25000003 PHARM REV CODE 250: Performed by: STUDENT IN AN ORGANIZED HEALTH CARE EDUCATION/TRAINING PROGRAM

## 2025-07-16 PROCEDURE — 82565 ASSAY OF CREATININE: CPT

## 2025-07-16 PROCEDURE — A9577 INJ MULTIHANCE: HCPCS

## 2025-07-16 PROCEDURE — 99223 1ST HOSP IP/OBS HIGH 75: CPT | Mod: ,,, | Performed by: PSYCHIATRY & NEUROLOGY

## 2025-07-16 PROCEDURE — 87040 BLOOD CULTURE FOR BACTERIA: CPT

## 2025-07-16 RX ORDER — IBUPROFEN 200 MG
16 TABLET ORAL
Status: DISCONTINUED | OUTPATIENT
Start: 2025-07-16 | End: 2025-07-18 | Stop reason: HOSPADM

## 2025-07-16 RX ORDER — IBUPROFEN 200 MG
24 TABLET ORAL
Status: DISCONTINUED | OUTPATIENT
Start: 2025-07-16 | End: 2025-07-18 | Stop reason: HOSPADM

## 2025-07-16 RX ORDER — GLUCAGON 1 MG
1 KIT INJECTION
Status: DISCONTINUED | OUTPATIENT
Start: 2025-07-16 | End: 2025-07-18 | Stop reason: HOSPADM

## 2025-07-16 RX ORDER — NALOXONE HCL 0.4 MG/ML
0.02 VIAL (ML) INJECTION
Status: DISCONTINUED | OUTPATIENT
Start: 2025-07-16 | End: 2025-07-18 | Stop reason: HOSPADM

## 2025-07-16 RX ORDER — SODIUM CHLORIDE 0.9 % (FLUSH) 0.9 %
10 SYRINGE (ML) INJECTION EVERY 12 HOURS PRN
Status: DISCONTINUED | OUTPATIENT
Start: 2025-07-16 | End: 2025-07-18 | Stop reason: HOSPADM

## 2025-07-16 RX ADMIN — GADOBENATE DIMEGLUMINE 18 ML: 529 INJECTION, SOLUTION INTRAVENOUS at 01:07

## 2025-07-16 RX ADMIN — INSULIN ASPART 2 UNITS: 100 INJECTION, SOLUTION INTRAVENOUS; SUBCUTANEOUS at 12:07

## 2025-07-16 RX ADMIN — INSULIN ASPART 2 UNITS: 100 INJECTION, SOLUTION INTRAVENOUS; SUBCUTANEOUS at 07:07

## 2025-07-16 RX ADMIN — MUPIROCIN: 20 OINTMENT TOPICAL at 08:07

## 2025-07-16 RX ADMIN — MUPIROCIN: 20 OINTMENT TOPICAL at 09:07

## 2025-07-16 RX ADMIN — AMLODIPINE BESYLATE 5 MG: 5 TABLET ORAL at 07:07

## 2025-07-16 RX ADMIN — LEVETIRACETAM 500 MG: 500 TABLET, FILM COATED ORAL at 08:07

## 2025-07-16 RX ADMIN — LEVETIRACETAM 500 MG: 500 TABLET, FILM COATED ORAL at 07:07

## 2025-07-16 NOTE — PLAN OF CARE
07/16/25 1200   Discharge Assessment   Assessment Type Discharge Planning Assessment   Confirmed/corrected address, phone number and insurance Yes   Source of Information patient   Communicated JOVANI with patient/caregiver Date not available/Unable to determine   People in Home significant other   Name(s) of People in Home debra Ramos friend [phone]   Do you expect to return to your current living situation? Yes   Do you have help at home or someone to help you manage your care at home? Yes   Who are your caregiver(s) and their phone number(s)? debra Ramos friend [phone]   Prior to hospitilization cognitive status: Alert/Oriented   Current cognitive status: Alert/Oriented   Walking or Climbing Stairs Difficulty no   Dressing/Bathing Difficulty no   Equipment Currently Used at Home blood pressure machine;shower chair;grab bar   Readmission within 30 days? No   Patient currently being followed by outpatient case management? No   Do you currently have service(s) that help you manage your care at home? No   Do you take prescription medications? Yes   Do you have prescription coverage? Yes   Do you have any problems affording any of your prescribed medications? No   Is the patient taking medications as prescribed? yes   Who is going to help you get home at discharge? debra Ramos friend [phone]   How do you get to doctors appointments? car, drives self   Are you on dialysis? No   Do you take coumadin? No   Discharge Plan A Home with family   Discharge Plan B Home with family   DME Needed Upon Discharge  none   Discharge Plan discussed with: Patient;Adult children;Friend   Name(s) and Number(s) debra Ramos friend [phone]   Transition of Care Barriers None   Physical Activity   On average, how many days per week do you engage in moderate to strenuous exercise (like a brisk walk)? 0 days   On average, how many minutes do you engage in exercise at this level? 0 min   Financial Resource Strain    How hard is it for you to pay for the very basics like food, housing, medical care, and heating? Not very   Housing Stability   In the last 12 months, was there a time when you were not able to pay the mortgage or rent on time? N   At any time in the past 12 months, were you homeless or living in a shelter (including now)? N   Transportation Needs   In the past 12 months, has lack of transportation kept you from medical appointments or from getting medications? no   In the past 12 months, has lack of transportation kept you from meetings, work, or from getting things needed for daily living? No   Food Insecurity   Within the past 12 months, you worried that your food would run out before you got the money to buy more. Never true   Within the past 12 months, the food you bought just didn't last and you didn't have money to get more. Never true   Stress   Do you feel stress - tense, restless, nervous, or anxious, or unable to sleep at night because your mind is troubled all the time - these days? Not at all   Social Isolation   How often do you feel lonely or isolated from those around you?  Never   Alcohol Use   Q1: How often do you have a drink containing alcohol? Monthly or l   Utilities   In the past 12 months has the electric, gas, oil, or water company threatened to shut off services in your home? No   Health Literacy   How often do you need to have someone help you when you read instructions, pamphlets, or other written material from your doctor or pharmacy? Sometimes

## 2025-07-16 NOTE — CONSULTS
[facility] - [facility] ICU  Neurology  Consult Note    Patient Name: Iam Montero  MRN: [id number]  Admission Date: 7/15/2025  Hospital Length of Stay: 1 days  Code Status: Full Code   Attending Provider: Villa Acosta,Mary   Consulting Provider: MELONY Alaniz  Primary Care Physician: Walter Paul MD  Principal Problem:<principal problem not specified>    Inpatient consult to Neurology Services (Vascular Neurology)  Consult performed by: Jrodyn Caicedo FNP  Consult ordered by: Villa Acosta MD         Subjective:     Chief Complaint:    Chief Complaint   Patient presents with    Transfer     Transfer from [facility] for neurosx/left lateral ventricle temporal bleed with 2 mm shift. GCS 14 on arrival.      HPI:   80-year-old male with PMH DM2, GERD, HTN, NPH s/p  shunt, and [facility] services d/t L lateral ventricle temporal bleed.  Patient evaluated by Neurosurgery, no plans for surgical intervention.   On exam patient noted to be alert and oriented, however, noted to have some confusion in conversation for which general neurology has been consulted.    CTA h/n unrevealing for interval change to previously seen L temporal IPH, no LVO or flow-limiting stenosis.  Interventional neurology team consulted for DSA per NSGY given the atypical nature of the IPH.      Past Medical History:   Diagnosis Date    Chronic meningitis     Cognitive disorder     Diabetes mellitus     Gait disorder     GERD (gastroesophageal reflux disease)     History of lumbar surgery     HTN (hypertension)     Hydrocephalus     08/2021    Hyperlipidemia     Ventricular premature complex        Past Surgical History:   Procedure Laterality Date    BRAIN SURGERY      shunt placed 08/2021    CERVICAL SPINE SURGERY  2007    LAMINECTOMY Right 1995    LAMINECTOMY Left 1979    VENTRICULOPERITONEAL SHUNT  08/24/2021       Review of patient's allergies  indicates:   Allergen Reactions    Clindamycin      Other reaction(s): Unknown    Sulfa (sulfonamide antibiotics)      Other reaction(s): Unknown    Tetracycline      Other reaction(s): Unknown    Penicillin Rash    Tobramycin Rash       Current Neurological Medications:      No current facility-administered medications on file prior to encounter.     Current Outpatient Medications on File Prior to Encounter   Medication Sig    allopurinoL (ZYLOPRIM) 300 MG tablet Take 300 mg by mouth once daily.    amLODIPine (NORVASC) 5 MG tablet Take 5 mg by mouth once daily.    aspirin (ECOTRIN) 81 MG EC tablet Take 81 mg by mouth 2 (two) times a day.    co-enzyme Q-10 30 mg capsule Take 30 mg by mouth 3 (three) times daily.    folic acid (FOLVITE) 800 MCG Tab Take 800 mcg by mouth once daily.    glyBURIDE (DIABETA) 5 MG tablet Take 5 mg by mouth 2 (two) times daily with meals. 1.5 tablets (7.5 mg) morning and night; 1 tablet (5mg) at noon    magnesium oxide (MAG-OX) 400 mg (241.3 mg magnesium) tablet Take 400 mg by mouth 2 (two) times daily.    nebivoloL (BYSTOLIC) 10 MG Tab Take 10 mg by mouth once daily.    olmesartan (BENICAR) 40 MG tablet Take 40 mg by mouth once daily.    pantoprazole (PROTONIX) 40 MG tablet Take 80 mg by mouth once daily.    rosuvastatin (CRESTOR) 5 MG tablet Take 5 mg by mouth once daily.    tadalafiL (CIALIS) 10 MG tablet Take 10 mg by mouth daily as needed for Erectile Dysfunction.    testosterone cypionate (DEPOTESTOTERONE CYPIONATE) 200 mg/mL injection Inject into the muscle every 14 (fourteen) days.     Family History       Problem Relation (Age of Onset)    Stroke Mother          Tobacco Use    Smoking status: Never    Smokeless tobacco: Never   Substance and Sexual Activity    Alcohol use: Yes    Drug use: Never    Sexual activity: Not on file     Review of Systems   Eyes:  Negative for photophobia and visual disturbance.   Gastrointestinal:  Negative for nausea and vomiting.   Musculoskeletal:   Negative for neck pain and neck stiffness.   Neurological:  Negative for dizziness, tremors, seizures, syncope, facial asymmetry, speech difficulty, weakness, light-headedness, numbness and headaches.   Psychiatric/Behavioral:  Positive for confusion (some mild memory issues). Negative for agitation and behavioral problems.      Objective:     Vital Signs (Most Recent):  Temp: 98.6 °F (37 °C) (07/16/25 1200)  Pulse: 67 (07/16/25 1249)  Resp: (!) 23 (07/16/25 1249)  BP: (!) 137/98 (07/16/25 1234)  SpO2: 95 % (07/16/25 1249) Vital Signs (24h Range):  Temp:  [97.9 °F (36.6 °C)-99.2 °F (37.3 °C)] 98.6 °F (37 °C)  Pulse:  [61-78] 67  Resp:  [6-27] 23  SpO2:  [91 %-97 %] 95 %  BP: (118-174)/(55-98) 137/98     Weight: 90.7 kg (200 lb)  Body mass index is 27.12 kg/m².     Physical Exam  Vitals and nursing note reviewed.   Constitutional:       General: He is not in acute distress.     Appearance: Normal appearance. He is normal weight. He is not ill-appearing or toxic-appearing.   HENT:      Head: Normocephalic and atraumatic.      Mouth/Throat:      Mouth: Mucous membranes are moist.   Eyes:      General: No visual field deficit.     Extraocular Movements: Extraocular movements intact.      Pupils: Pupils are equal, round, and reactive to light.   Pulmonary:      Effort: Pulmonary effort is normal. No respiratory distress.   Musculoskeletal:         General: Normal range of motion.      Right lower leg: No edema.      Left lower leg: No edema.   Skin:     General: Skin is warm and dry.      Capillary Refill: Capillary refill takes less than 2 seconds.      Findings: No lesion.   Neurological:      General: No focal deficit present.      Mental Status: He is alert and oriented to person, place, and time.      GCS: GCS eye subscore is 4. GCS verbal subscore is 5. GCS motor subscore is 6.      Cranial Nerves: No cranial nerve deficit, dysarthria or facial asymmetry.      Sensory: No sensory deficit.      Motor: No weakness,  tremor, atrophy, abnormal muscle tone, seizure activity or pronator drift.   Psychiatric:         Mood and Affect: Mood normal.         Behavior: Behavior normal.          NEUROLOGICAL EXAMINATION:     MENTAL STATUS   Oriented to person, place, and time.     CRANIAL NERVES     CN III, IV, VI   Pupils are equal, round, and reactive to light.      Significant Labs: CBC:   Recent Labs   Lab 07/15/25  1344   WBC 10.57   HGB 15.1   HCT 47.4        CMP:   Recent Labs   Lab 07/15/25  1344 07/16/25  1009   *  --      --    K 3.6  --      --    CO2 25  --    BUN 12.6  --    CREATININE 0.75 0.78   CALCIUM 8.7*  --    PROT 7.2  --    ALBUMIN 3.8  --    BILITOT 0.5  --    ALKPHOS 95  --    AST 14  --    ALT 23  --      All pertinent lab results from the past 24 hours have been reviewed.    Significant Imaging: I have reviewed all pertinent imaging results/findings within the past 24 hours.  Assessment and Plan:     Intraparenchymal hemorrhage of brain  - presented with confusion, fatigue, memory issues, word finding difficulty, increased disorientation; patient presented to the ED as a transfer from an outlying facility for a left lateral ventricle temporal bleed     Imaging:  -CTh: A ventriculostomy drain through a right parietal approach is again seen in stable position with its tip located within the frontal horn of the right lateral ventricle. There is a slight decrease in the intraventricular hemorrhage seen within the posterior and temporal horns of the left lateral ventricle with similar degree of minimal transependymal edema. No new hemorrhagic focus identified.   CTA head/neck: 1. No significant short interval change of previously seen left temporal intraparenchymal hemorrhage.  2. Intracranial atherosclerotic changes without evidence of large vessel occlusion.  3. Mild scattered atherosclerotic changes of the cervical vasculature without flow limiting stenosis.    MRI brain w/ w/o: 1. Stable  left hippocampal parenchymal hemorrhage with intraventricular extension.  No definite suspicious nodular enhancement.  2. Subacute appearing ischemic changes in the left hippocampal body.  3. Smooth diffuse dural thickening is likely related to shunted ventricles.    Plan:  -NSGY following and has requested interventional neuro consult for possible DSA given atypical nature of the bleeding  -patient unsure if he would like to proceed with DSA, requesting to speak to MD.   -he agreed to NPO at midnight for potential diagnostic cerebral angio tomorrow after discussion with MD  -other recommendations per primary and neurosurgery teams     Further recs per MD.       Jordyn Caicedo, FNP  Neurology  [facility] - [facility] ICU

## 2025-07-16 NOTE — HPI
80-year-old male with PMH DM2, GERD, HTN, NPH s/p  shunt, and HLD transferred from [facility] on 07/15 for neurosurgery services d/t L lateral ventricle temporal bleed.  Patient evaluated by Neurosurgery, no plans for surgical intervention.   On exam patient noted to be alert and oriented, however, noted to have some confusion in conversation for which general neurology has been consulted.    CTA h/n unrevealing for interval change to previously seen L temporal IPH, no LVO or flow-limiting stenosis.  Interventional neurology team consulted for DSA per NSGY given the atypical nature of the IPH.

## 2025-07-16 NOTE — SUBJECTIVE & OBJECTIVE
Past Medical History:   Diagnosis Date    Chronic meningitis     Cognitive disorder     Diabetes mellitus     Gait disorder     GERD (gastroesophageal reflux disease)     History of lumbar surgery     HTN (hypertension)     Hydrocephalus     08/2021    Hyperlipidemia     Ventricular premature complex        Past Surgical History:   Procedure Laterality Date    BRAIN SURGERY      shunt placed 08/2021    CERVICAL SPINE SURGERY  2007    LAMINECTOMY Right 1995    LAMINECTOMY Left 1979    VENTRICULOPERITONEAL SHUNT  08/24/2021       Review of patient's allergies indicates:   Allergen Reactions    Clindamycin      Other reaction(s): Unknown    Sulfa (sulfonamide antibiotics)      Other reaction(s): Unknown    Tetracycline      Other reaction(s): Unknown    Penicillin Rash    Tobramycin Rash       Current Neurological Medications:      No current facility-administered medications on file prior to encounter.     Current Outpatient Medications on File Prior to Encounter   Medication Sig    allopurinoL (ZYLOPRIM) 300 MG tablet Take 300 mg by mouth once daily.    amLODIPine (NORVASC) 5 MG tablet Take 5 mg by mouth once daily.    aspirin (ECOTRIN) 81 MG EC tablet Take 81 mg by mouth 2 (two) times a day.    co-enzyme Q-10 30 mg capsule Take 30 mg by mouth 3 (three) times daily.    folic acid (FOLVITE) 800 MCG Tab Take 800 mcg by mouth once daily.    glyBURIDE (DIABETA) 5 MG tablet Take 5 mg by mouth 2 (two) times daily with meals. 1.5 tablets (7.5 mg) morning and night; 1 tablet (5mg) at noon    magnesium oxide (MAG-OX) 400 mg (241.3 mg magnesium) tablet Take 400 mg by mouth 2 (two) times daily.    nebivoloL (BYSTOLIC) 10 MG Tab Take 10 mg by mouth once daily.    olmesartan (BENICAR) 40 MG tablet Take 40 mg by mouth once daily.    pantoprazole (PROTONIX) 40 MG tablet Take 80 mg by mouth once daily.    rosuvastatin (CRESTOR) 5 MG tablet Take 5 mg by mouth once daily.    tadalafiL (CIALIS) 10 MG tablet Take 10 mg by mouth daily as  needed for Erectile Dysfunction.    testosterone cypionate (DEPOTESTOTERONE CYPIONATE) 200 mg/mL injection Inject into the muscle every 14 (fourteen) days.     Family History       Problem Relation (Age of Onset)    Stroke Mother          Tobacco Use    Smoking status: Never    Smokeless tobacco: Never   Substance and Sexual Activity    Alcohol use: Yes    Drug use: Never    Sexual activity: Not on file     Review of Systems   Eyes:  Negative for photophobia and visual disturbance.   Gastrointestinal:  Negative for nausea and vomiting.   Musculoskeletal:  Negative for neck pain and neck stiffness.   Neurological:  Negative for dizziness, tremors, seizures, syncope, facial asymmetry, speech difficulty, weakness, light-headedness, numbness and headaches.   Psychiatric/Behavioral:  Positive for confusion (some mild memory issues). Negative for agitation and behavioral problems.      Objective:     Vital Signs (Most Recent):  Temp: 98.6 °F (37 °C) (07/16/25 1200)  Pulse: 67 (07/16/25 1249)  Resp: (!) 23 (07/16/25 1249)  BP: (!) 137/98 (07/16/25 1234)  SpO2: 95 % (07/16/25 1249) Vital Signs (24h Range):  Temp:  [97.9 °F (36.6 °C)-99.2 °F (37.3 °C)] 98.6 °F (37 °C)  Pulse:  [61-78] 67  Resp:  [6-27] 23  SpO2:  [91 %-97 %] 95 %  BP: (118-174)/(55-98) 137/98     Weight: 90.7 kg (200 lb)  Body mass index is 27.12 kg/m².     Physical Exam  Vitals and nursing note reviewed.   Constitutional:       General: He is not in acute distress.     Appearance: Normal appearance. He is normal weight. He is not ill-appearing or toxic-appearing.   HENT:      Head: Normocephalic and atraumatic.      Mouth/Throat:      Mouth: Mucous membranes are moist.   Eyes:      General: No visual field deficit.     Extraocular Movements: Extraocular movements intact.      Pupils: Pupils are equal, round, and reactive to light.   Pulmonary:      Effort: Pulmonary effort is normal. No respiratory distress.   Musculoskeletal:         General: Normal range  of motion.      Right lower leg: No edema.      Left lower leg: No edema.   Skin:     General: Skin is warm and dry.      Capillary Refill: Capillary refill takes less than 2 seconds.      Findings: No lesion.   Neurological:      General: No focal deficit present.      Mental Status: He is alert and oriented to person, place, and time.      GCS: GCS eye subscore is 4. GCS verbal subscore is 5. GCS motor subscore is 6.      Cranial Nerves: No cranial nerve deficit, dysarthria or facial asymmetry.      Sensory: No sensory deficit.      Motor: No weakness, tremor, atrophy, abnormal muscle tone, seizure activity or pronator drift.   Psychiatric:         Mood and Affect: Mood normal.         Behavior: Behavior normal.          NEUROLOGICAL EXAMINATION:     MENTAL STATUS   Oriented to person, place, and time.     CRANIAL NERVES     CN III, IV, VI   Pupils are equal, round, and reactive to light.      Significant Labs: CBC:   Recent Labs   Lab 07/15/25  1344   WBC 10.57   HGB 15.1   HCT 47.4        CMP:   Recent Labs   Lab 07/15/25  1344 07/16/25  1009   *  --      --    K 3.6  --      --    CO2 25  --    BUN 12.6  --    CREATININE 0.75 0.78   CALCIUM 8.7*  --    PROT 7.2  --    ALBUMIN 3.8  --    BILITOT 0.5  --    ALKPHOS 95  --    AST 14  --    ALT 23  --      All pertinent lab results from the past 24 hours have been reviewed.    Significant Imaging: I have reviewed all pertinent imaging results/findings within the past 24 hours.

## 2025-07-16 NOTE — ASSESSMENT & PLAN NOTE
- presented with confusion, fatigue, memory issues, word finding difficulty, increased disorientation; patient presented to the ED as a transfer from an outlying facility for a left lateral ventricle temporal bleed     Imaging:  -CTh: A ventriculostomy drain through a right parietal approach is again seen in stable position with its tip located within the frontal horn of the right lateral ventricle. There is a slight decrease in the intraventricular hemorrhage seen within the posterior and temporal horns of the left lateral ventricle with similar degree of minimal transependymal edema. No new hemorrhagic focus identified.   CTA head/neck: 1. No significant short interval change of previously seen left temporal intraparenchymal hemorrhage.  2. Intracranial atherosclerotic changes without evidence of large vessel occlusion.  3. Mild scattered atherosclerotic changes of the cervical vasculature without flow limiting stenosis.    MRI brain w/ w/o: 1. Stable left hippocampal parenchymal hemorrhage with intraventricular extension.  No definite suspicious nodular enhancement.  2. Subacute appearing ischemic changes in the left hippocampal body.  3. Smooth diffuse dural thickening is likely related to shunted ventricles.    Plan:  -NSGY following and has requested interventional neuro consult for possible DSA given atypical nature of the bleeding  -patient unsure if he would like to proceed with DSA, requesting to speak to MD.   -he agreed to NPO at midnight for potential diagnostic cerebral angio tomorrow after discussion with MD  -other recommendations per primary and neurosurgery teams     Further recs per MD.

## 2025-07-16 NOTE — CONSULTS
[facility] - [facility] ICU  Neurology  Consult Note    Patient Name: Iam Montero  MRN: [id number]  Admission Date: 7/15/2025  Hospital Length of Stay: 1 days  Code Status: Full Code   Attending Provider: Villa Acosta,*   Consulting Provider: SANYA Marrero  Primary Care Physician: Walter Paul MD  Principal Problem:<principal problem not specified>    Inpatient Consult to Neurology Services (General Neurology)  Consult performed by: Ysabel Glass AGACNP-BC  Consult ordered by: Tyler Nuñez MD         Subjective:     Chief Complaint:       HPI:   80-year-old male with PMH DM2, GERD, HTN, NPH s/p  shunt, and HLD transferred from [facility] facility on 07/15 for neurosurgery services d/t L lateral ventricle temporal bleed.  Patient evaluated by Neurosurgery, no plans for surgical intervention.   On exam patient noted to be alert and oriented, however, noted to have some confusion in conversation for which general neurology has been consulted.  Of note, patient and they are retired internal medicine doctor.    CTA h/n unrevealing for interval change to previously seen L temporal IPH, no LVO or flow-limiting stenosis.     Past Medical History:   Diagnosis Date    Chronic meningitis     Cognitive disorder     Diabetes mellitus     Gait disorder     GERD (gastroesophageal reflux disease)     History of lumbar surgery     HTN (hypertension)     Hydrocephalus     08/2021    Hyperlipidemia     Ventricular premature complex        Past Surgical History:   Procedure Laterality Date    BRAIN SURGERY      shunt placed 08/2021    CERVICAL SPINE SURGERY  2007    LAMINECTOMY Right 1995    LAMINECTOMY Left 1979    VENTRICULOPERITONEAL SHUNT  08/24/2021       Review of patient's allergies indicates:   Allergen Reactions    Clindamycin      Other reaction(s): Unknown    Sulfa (sulfonamide antibiotics)      Other reaction(s): Unknown    Tetracycline      Other reaction(s): Unknown    Penicillin  Rash    Tobramycin Rash       Current Neurological Medications:     No current facility-administered medications on file prior to encounter.     Current Outpatient Medications on File Prior to Encounter   Medication Sig    allopurinoL (ZYLOPRIM) 300 MG tablet Take 300 mg by mouth once daily.    amLODIPine (NORVASC) 5 MG tablet Take 5 mg by mouth once daily.    aspirin (ECOTRIN) 81 MG EC tablet Take 81 mg by mouth 2 (two) times a day.    co-enzyme Q-10 30 mg capsule Take 30 mg by mouth 3 (three) times daily.    folic acid (FOLVITE) 800 MCG Tab Take 800 mcg by mouth once daily.    glyBURIDE (DIABETA) 5 MG tablet Take 5 mg by mouth 2 (two) times daily with meals. 1.5 tablets (7.5 mg) morning and night; 1 tablet (5mg) at noon    magnesium oxide (MAG-OX) 400 mg (241.3 mg magnesium) tablet Take 400 mg by mouth 2 (two) times daily.    nebivoloL (BYSTOLIC) 10 MG Tab Take 10 mg by mouth once daily.    olmesartan (BENICAR) 40 MG tablet Take 40 mg by mouth once daily.    pantoprazole (PROTONIX) 40 MG tablet Take 80 mg by mouth once daily.    rosuvastatin (CRESTOR) 5 MG tablet Take 5 mg by mouth once daily.    tadalafiL (CIALIS) 10 MG tablet Take 10 mg by mouth daily as needed for Erectile Dysfunction.    testosterone cypionate (DEPOTESTOTERONE CYPIONATE) 200 mg/mL injection Inject into the muscle every 14 (fourteen) days.     Family History       Problem Relation (Age of Onset)    Stroke Mother          Tobacco Use    Smoking status: Never    Smokeless tobacco: Never   Substance and Sexual Activity    Alcohol use: Yes    Drug use: Never    Sexual activity: Not on file     Review of Systems  A 14pt ros was reviewed & is negative unless o/w documented in the hpi    Objective:     Vital Signs (Most Recent):  Temp: 98.6 °F (37 °C) (07/16/25 1200)  Pulse: 67 (07/16/25 1249)  Resp: (!) 23 (07/16/25 1249)  BP: (!) 137/98 (07/16/25 1234)  SpO2: 95 % (07/16/25 1249) Vital Signs (24h Range):  Temp:  [97.9 °F (36.6 °C)-99.2 °F (37.3 °C)]  98.6 °F (37 °C)  Pulse:  [61-78] 67  Resp:  [6-27] 23  SpO2:  [91 %-98 %] 95 %  BP: (118-190)/() 137/98     Weight: 90.7 kg (200 lb)  Body mass index is 27.12 kg/m².     Physical Exam  Vitals reviewed.   Constitutional:       General: He is awake.   HENT:      Head: Normocephalic and atraumatic.      Nose: Nose normal.      Mouth/Throat:      Mouth: Mucous membranes are moist.      Pharynx: Oropharynx is clear.   Eyes:      Extraocular Movements: Extraocular movements intact and EOM normal.      Pupils: Pupils are equal, round, and reactive to light.   Pulmonary:      Effort: Pulmonary effort is normal.   Musculoskeletal:         General: Normal range of motion.      Cervical back: Normal range of motion.   Skin:     General: Skin is warm and dry.   Neurological:      Mental Status: He is alert and oriented to person, place, and time.   Psychiatric:         Speech: Speech normal.         Behavior: Behavior is cooperative.         Cognition and Memory: Memory is impaired. He exhibits impaired recent memory and impaired remote memory.         Judgment: Judgment is inappropriate.          NEUROLOGICAL EXAMINATION:     MENTAL STATUS   Oriented to person, place, and time.   Follows 1 step commands.   Speech: speech is normal   Level of consciousness: alert  Knowledge: poor and inconsistent with education.     CRANIAL NERVES     CN II   Visual fields full to confrontation.     CN III, IV, VI   Pupils are equal, round, and reactive to light.  Extraocular motions are normal.   Nystagmus: none   Conjugate gaze: present    MOTOR EXAM        Opposes gravity with all extremities     SENSORY EXAM   Light touch normal.       Significant Labs:   Recent Lab Results  (Last 5 results in the past 24 hours)        07/16/25  1209   07/16/25  1009   07/16/25  0740   07/15/25  2259   07/15/25  1344        Albumin/Globulin Ratio         1.1       Albumin         3.8       ALP         95       ALT         23       Anion Gap          9.0       PTT         26.4  Comment: For Minimal Heparin Infusion, the goal aPTT 64-85 seconds corresponds to an anti-Xa of 0.3-0.5.    For Low Intensity and High Intensity Heparin, the goal aPTT  seconds corresponds to an anti-Xa of 0.3-0.7       AST         14       Baso #         0.02       Basophil %         0.2       BILIRUBIN TOTAL         0.5       BUN         12.6       BUN/CREAT RATIO         17       Calcium         8.7       Chloride         106       CO2         25       Creatinine   0.78       0.75       eGFR   >60  Comment: Estimated GFR calculated using the CKD-EPI creatinine (2021) equation.       >60  Comment: Estimated GFR calculated using the CKD-EPI creatinine (2021) equation.       Eos #         0.05       Eos %         0.5       Globulin, Total         3.4       Glucose         163       Hematocrit         47.4       Hemoglobin         15.1       Immature Grans (Abs)         0.06       Immature Granulocytes         0.6       INR         1.0       Lymph #         2.13       LYMPH %         20.2       MCH         27.1       MCHC         31.9       MCV         84.9       Mono #         0.91       Mono %         8.6       MPV         9.7       Neut #         7.40       Neut %         69.9       nRBC         0.0       Platelet Count         262       POCT Glucose 197     192   193         Potassium         3.6       PROTEIN TOTAL         7.2       PT         12.9       RBC         5.58       RDW         17.1       Sodium         140       WBC         10.57                              Significant Imaging:   CT head w/o:  Impression:     1. A ventriculostomy drain through a right parietal approach is again seen in stable position with its tip located within the frontal horn of the right lateral ventricle. There is a slight decrease in the intraventricular hemorrhage seen within the posterior and temporal horns of the left lateral ventricle with similar degree of minimal transependymal edema. No  new hemorrhagic focus identified.     2. Details and other findings as noted above.       CTA head/neck:  IMPRESSION  1. No significant short interval change of previously seen left temporal intraparenchymal hemorrhage.  2. Intracranial atherosclerotic changes without evidence of large vessel occlusion.  3. Mild scattered atherosclerotic changes of the cervical vasculature without flow limiting stenosis.  4. Chronic secondary findings discussed above.    I have reviewed all pertinent imaging results/findings within the past 24 hours.  Assessment and Plan:     Memory deficit  Likely some underlying dementia    -recommend dementia work-up to be done o/p with dr. Henriquez   -can continue keppra following discharge as well  -MRI brain w w/o pending  -Nsx following        VTE Risk Mitigation (From admission, onward)           Ordered     IP VTE HIGH RISK PATIENT  Once         07/16/25 1228     Place sequential compression device  Until discontinued         07/16/25 1228                    Thank you for your consult. Further recommendations to follow per MD Ysabel Glass, [facility]-BC  Neurology  [facility] - [address] [place] [address]    I have seen/examined the patient.  NP was scribe.  I agree with the findings unless outlined below:    Octavio Dhillon MD  [facility]     Pt seen/examined  No recollection of ever having a shunt  Pre-ICH was repeating self at home, but overall function was good  Here is more confused  On keppra as a precaution; may have had a sz at home prior to arrival  Imaging stable    [facility] outpatient dementia eval with rio as he is already established  Signing off

## 2025-07-16 NOTE — ASSESSMENT & PLAN NOTE
Likely some underlying dementia    -recommend dementia work-up to be done o/p with dr. Henriquez   -can continue keppra following discharge as well  -MRI brain w w/o pending  -Nsx following

## 2025-07-16 NOTE — SUBJECTIVE & OBJECTIVE
Past Medical History:   Diagnosis Date    Chronic meningitis     Cognitive disorder     Diabetes mellitus     Gait disorder     GERD (gastroesophageal reflux disease)     History of lumbar surgery     HTN (hypertension)     Hydrocephalus     08/2021    Hyperlipidemia     Ventricular premature complex        Past Surgical History:   Procedure Laterality Date    BRAIN SURGERY      shunt placed 08/2021    CERVICAL SPINE SURGERY  2007    LAMINECTOMY Right 1995    LAMINECTOMY Left 1979    VENTRICULOPERITONEAL SHUNT  08/24/2021       Review of patient's allergies indicates:   Allergen Reactions    Clindamycin      Other reaction(s): Unknown    Sulfa (sulfonamide antibiotics)      Other reaction(s): Unknown    Tetracycline      Other reaction(s): Unknown    Penicillin Rash    Tobramycin Rash       Current Neurological Medications:     No current facility-administered medications on file prior to encounter.     Current Outpatient Medications on File Prior to Encounter   Medication Sig    allopurinoL (ZYLOPRIM) 300 MG tablet Take 300 mg by mouth once daily.    amLODIPine (NORVASC) 5 MG tablet Take 5 mg by mouth once daily.    aspirin (ECOTRIN) 81 MG EC tablet Take 81 mg by mouth 2 (two) times a day.    co-enzyme Q-10 30 mg capsule Take 30 mg by mouth 3 (three) times daily.    folic acid (FOLVITE) 800 MCG Tab Take 800 mcg by mouth once daily.    glyBURIDE (DIABETA) 5 MG tablet Take 5 mg by mouth 2 (two) times daily with meals. 1.5 tablets (7.5 mg) morning and night; 1 tablet (5mg) at noon    magnesium oxide (MAG-OX) 400 mg (241.3 mg magnesium) tablet Take 400 mg by mouth 2 (two) times daily.    nebivoloL (BYSTOLIC) 10 MG Tab Take 10 mg by mouth once daily.    olmesartan (BENICAR) 40 MG tablet Take 40 mg by mouth once daily.    pantoprazole (PROTONIX) 40 MG tablet Take 80 mg by mouth once daily.    rosuvastatin (CRESTOR) 5 MG tablet Take 5 mg by mouth once daily.    tadalafiL (CIALIS) 10 MG tablet Take 10 mg by mouth daily as  needed for Erectile Dysfunction.    testosterone cypionate (DEPOTESTOTERONE CYPIONATE) 200 mg/mL injection Inject into the muscle every 14 (fourteen) days.     Family History       Problem Relation (Age of Onset)    Stroke Mother          Tobacco Use    Smoking status: Never    Smokeless tobacco: Never   Substance and Sexual Activity    Alcohol use: Yes    Drug use: Never    Sexual activity: Not on file     Review of Systems  A 14pt ros was reviewed & is negative unless o/w documented in the hpi    Objective:     Vital Signs (Most Recent):  Temp: 98.6 °F (37 °C) (07/16/25 1200)  Pulse: 67 (07/16/25 1249)  Resp: (!) 23 (07/16/25 1249)  BP: (!) 137/98 (07/16/25 1234)  SpO2: 95 % (07/16/25 1249) Vital Signs (24h Range):  Temp:  [97.9 °F (36.6 °C)-99.2 °F (37.3 °C)] 98.6 °F (37 °C)  Pulse:  [61-78] 67  Resp:  [6-27] 23  SpO2:  [91 %-98 %] 95 %  BP: (118-190)/() 137/98     Weight: 90.7 kg (200 lb)  Body mass index is 27.12 kg/m².     Physical Exam  Vitals reviewed.   Constitutional:       General: He is awake.   HENT:      Head: Normocephalic and atraumatic.      Nose: Nose normal.      Mouth/Throat:      Mouth: Mucous membranes are moist.      Pharynx: Oropharynx is clear.   Eyes:      Extraocular Movements: Extraocular movements intact and EOM normal.      Pupils: Pupils are equal, round, and reactive to light.   Pulmonary:      Effort: Pulmonary effort is normal.   Musculoskeletal:         General: Normal range of motion.      Cervical back: Normal range of motion.   Skin:     General: Skin is warm and dry.   Neurological:      Mental Status: He is alert and oriented to person, place, and time.   Psychiatric:         Speech: Speech normal.         Behavior: Behavior is cooperative.         Cognition and Memory: Memory is impaired. He exhibits impaired recent memory and impaired remote memory.         Judgment: Judgment is inappropriate.          NEUROLOGICAL EXAMINATION:     MENTAL STATUS   Oriented to person,  place, and time.   Follows 1 step commands.   Speech: speech is normal   Level of consciousness: alert  Knowledge: poor and inconsistent with education.     CRANIAL NERVES     CN II   Visual fields full to confrontation.     CN III, IV, VI   Pupils are equal, round, and reactive to light.  Extraocular motions are normal.   Nystagmus: none   Conjugate gaze: present    MOTOR EXAM        Opposes gravity with all extremities     SENSORY EXAM   Light touch normal.       Significant Labs:   Recent Lab Results  (Last 5 results in the past 24 hours)        07/16/25  1209   07/16/25  1009   07/16/25  0740   07/15/25  2259   07/15/25  1344        Albumin/Globulin Ratio         1.1       Albumin         3.8       ALP         95       ALT         23       Anion Gap         9.0       PTT         26.4  Comment: For Minimal Heparin Infusion, the goal aPTT 64-85 seconds corresponds to an anti-Xa of 0.3-0.5.    For Low Intensity and High Intensity Heparin, the goal aPTT  seconds corresponds to an anti-Xa of 0.3-0.7       AST         14       Baso #         0.02       Basophil %         0.2       BILIRUBIN TOTAL         0.5       BUN         12.6       BUN/CREAT RATIO         17       Calcium         8.7       Chloride         106       CO2         25       Creatinine   0.78       0.75       eGFR   >60  Comment: Estimated GFR calculated using the CKD-EPI creatinine (2021) equation.       >60  Comment: Estimated GFR calculated using the CKD-EPI creatinine (2021) equation.       Eos #         0.05       Eos %         0.5       Globulin, Total         3.4       Glucose         163       Hematocrit         47.4       Hemoglobin         15.1       Immature Grans (Abs)         0.06       Immature Granulocytes         0.6       INR         1.0       Lymph #         2.13       LYMPH %         20.2       MCH         27.1       MCHC         31.9       MCV         84.9       Mono #         0.91       Mono %         8.6       MPV          9.7       Neut #         7.40       Neut %         69.9       nRBC         0.0       Platelet Count         262       POCT Glucose 197     192   193         Potassium         3.6       PROTEIN TOTAL         7.2       PT         12.9       RBC         5.58       RDW         17.1       Sodium         140       WBC         10.57                              Significant Imaging:   CT head w/o:  Impression:     1. A ventriculostomy drain through a right parietal approach is again seen in stable position with its tip located within the frontal horn of the right lateral ventricle. There is a slight decrease in the intraventricular hemorrhage seen within the posterior and temporal horns of the left lateral ventricle with similar degree of minimal transependymal edema. No new hemorrhagic focus identified.     2. Details and other findings as noted above.       CTA head/neck:  IMPRESSION  1. No significant short interval change of previously seen left temporal intraparenchymal hemorrhage.  2. Intracranial atherosclerotic changes without evidence of large vessel occlusion.  3. Mild scattered atherosclerotic changes of the cervical vasculature without flow limiting stenosis.  4. Chronic secondary findings discussed above.    I have reviewed all pertinent imaging results/findings within the past 24 hours.

## 2025-07-16 NOTE — PROGRESS NOTES
[facility] [address]  Neurosurgery  Progress Note    Subjective:     Interval History: F/u for IPH  He is lying in bed, NAD  He denies HA, blurred vision and N/V  CTA completed; intracranial atherosclerotic changes without evidence of large vessel occlusion and mild scattered atherosclerotic changes of the cervical vasculature without flow limiting stenosis.     Post-Op Info:  * No surgery found *          Medications:  Continuous Infusions:  Scheduled Meds:   amLODIPine  5 mg Oral Daily    levETIRAcetam  500 mg Oral BID    mupirocin   Nasal BID     PRN Meds:  Current Facility-Administered Medications:     dextrose 50%, 12.5 g, Intravenous, PRN    dextrose 50%, 25 g, Intravenous, PRN    glucagon (human recombinant), 1 mg, Intramuscular, PRN    glucose, 16 g, Oral, PRN    glucose, 24 g, Oral, PRN    hydrALAZINE, 10 mg, Intravenous, Q6H PRN    insulin aspart U-100, 0-10 Units, Subcutaneous, QID (AC + HS) PRN    labetalol, 20 mg, Intravenous, Q6H PRN     Review of Systems  Objective:     Weight: 90.7 kg (200 lb)  Body mass index is 27.12 kg/m².  Vital Signs (Most Recent):  Temp: 98.1 °F (36.7 °C) (07/16/25 0800)  Pulse: 73 (07/16/25 0830)  Resp: (!) 22 (07/16/25 0830)  BP: 128/78 (07/16/25 0830)  SpO2: (!) 94 % (07/16/25 0830) Vital Signs (24h Range):  Temp:  [97.9 °F (36.6 °C)-99.2 °F (37.3 °C)] 98.1 °F (36.7 °C)  Pulse:  [61-78] 73  Resp:  [6-27] 22  SpO2:  [91 %-98 %] 94 %  BP: (118-190)/() 128/78     Neurosurgery Physical Exam  Nursing note and vitals reviewed.     Constitutional: He appears well-developed and well-nourished.      Eyes: Pupils are equal, round, and reactive to light. EOM are normal.      Cardiovascular: Intact distal pulses.      Abdominal: Soft.      Skin:   Well healed shunt scar on the right scalp.      Psych/Behavior: He is alert. He is oriented to person and place. Confused on year and situation.  He does get confused in conversation at times.      Musculoskeletal:      Spoke to patient,  was seeing a doctor out of [facility] and they are recommending she start progesterone. They are going to fax over the appropriate clinical documentation, patient is moving to [place] in two weeks, she will f/u in office if needed.       Neck: Range of motion is full. There is no tenderness.        Back: Range of motion is full. There is no tenderness.        Right Upper Extremities: Range of motion is full. Muscle strength is 5/5.        Left Upper Extremities: Range of motion is full. Muscle strength is 5/5.        Left Lower Extremities: Range of motion is full. Muscle strength is 5/5.      Comments: Right foot drop, chronic.      Neurological:        Sensory: There is no sensory deficit in the trunk. There is sensory deficit in the extremities. Sensory deficit is located Chronic numbness in bilateral LE.        Cranial nerves: Cranial nerve(s) II, III, IV, V, VI, VII, VIII, IX, X, XI and XII are intact.      Neg pronator drift  Neg clonus    Significant Labs:  Recent Labs   Lab 07/15/25  1344   *      K 3.6      CO2 25   BUN 12.6   CREATININE 0.75   CALCIUM 8.7*     Recent Labs   Lab 07/15/25  1344   WBC 10.57   HGB 15.1   HCT 47.4        Recent Labs   Lab 07/15/25  1344   INR 1.0   APTT 26.4     Microbiology Results (last 7 days)       ** No results found for the last 168 hours. **          Significant Diagnostics:  CT Head Without Contrast [[id number]] Resulted: 07/16/25 0729   Order Status: Completed Updated: 07/16/25 0732   Narrative:       Technique: CT of the head was performed without intravenous contrast with axial as well as coronal and sagittal images.    Comparison: Comparison is with study dated July 15, 2025.    Dosage Information: Automated exposure control was utilized.    Clinical history: Fu ICH, IVH (intraventricular hemorrhage).    Findings:    Hemorrhage: A ventriculostomy drain through a right parietal approach is again seen in stable position with its tip located within the frontal horn of the right lateral ventricle. There is a slight decrease in the intraventricular hemorrhage seen within the posterior and temporal horns of the left lateral ventricle with similar degree of minimal  transependymal edema. No new hemorrhagic focus identified.    Bilateral subdural shallow subacute to chronic hematomas are stable.    Brain parenchyma: There is preservation of the grey white junction throughout. No acute large vessel territory infarct is identified.    Cerebellum: Unremarkable.    Vascular: Mild atheromatous calcification of the intracranial arteries is seen.    Calvarium: No acute linear or depressed skull fracture is seen.    Maxillofacial Structures:    Paranasal sinuses: The visualized paranasal sinuses appear clear with no significant mucoperiosteal thickening or air fluid levels identified.   Impression:     Impression:    1. A ventriculostomy drain through a right parietal approach is again seen in stable position with its tip located within the frontal horn of the right lateral ventricle. There is a slight decrease in the intraventricular hemorrhage seen within the posterior and temporal horns of the left lateral ventricle with similar degree of minimal transependymal edema. No new hemorrhagic focus identified.       Assessment/Plan:     IPH    He is GCS 14-15, exam waxes and wanes. He denies HA.  Repeat CT head this am is stable  No plans for surgical intervention  OK to downgrade with Q2 hour neuro exams  Continue BP parameters below 160/90  MRI brain pending  Shunt series ordered  Further recs once imaging complete    HEMANTH Bettencourt  Neurosurgery  [facility] - [facility] ICU

## 2025-07-16 NOTE — PLAN OF CARE
Problem: Adult Inpatient Plan of Care  Goal: Plan of Care Review  Outcome: Progressing  Goal: Patient-Specific Goal (Individualized)  Outcome: Progressing  Goal: Absence of Hospital-Acquired Illness or Injury  Outcome: Progressing  Goal: Optimal Comfort and Wellbeing  Outcome: Progressing  Goal: Readiness for Transition of Care  Outcome: Progressing     Problem: Skin Injury Risk Increased  Goal: Skin Health and Integrity  Outcome: Progressing     Problem: Stroke, Intracerebral Hemorrhage  Goal: Optimal Coping  Outcome: Progressing  Goal: Effective Bowel Elimination  Outcome: Progressing  Goal: Optimal Cerebral Tissue Perfusion  Outcome: Progressing  Goal: Optimal Cognitive Function  Outcome: Progressing  Goal: Effective Communication Skills  Outcome: Progressing  Goal: Optimal Functional Ability  Outcome: Progressing  Goal: Optimal Nutrition Intake  Outcome: Progressing  Goal: Optimal Pain Control and Function  Outcome: Progressing  Goal: Effective Oxygenation and Ventilation  Outcome: Progressing  Goal: Improved Sensorimotor Function  Outcome: Progressing  Goal: Safe and Effective Swallow  Outcome: Progressing  Goal: Effective Urinary Elimination  Outcome: Progressing     Problem: Stroke, Intracerebral Hemorrhage  Goal: Optimal Coping  Outcome: Progressing  Goal: Effective Bowel Elimination  Outcome: Progressing  Goal: Optimal Cerebral Tissue Perfusion  Outcome: Progressing  Goal: Optimal Cognitive Function  Outcome: Progressing  Goal: Effective Communication Skills  Outcome: Progressing  Goal: Optimal Functional Ability  Outcome: Progressing  Goal: Optimal Nutrition Intake  Outcome: Progressing  Goal: Optimal Pain Control and Function  Outcome: Progressing  Goal: Effective Oxygenation and Ventilation  Outcome: Progressing  Goal: Improved Sensorimotor Function  Outcome: Progressing  Goal: Safe and Effective Swallow  Outcome: Progressing  Goal: Effective Urinary Elimination  Outcome: Progressing

## 2025-07-16 NOTE — PROGRESS NOTES
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 Moy and Dr. Yi), and HLD who [place] [facility] [place] an [place] for a left lateral ventricle temporal bleed.     The patient complains of confusion and fatigue and "not feeling right" for the past 3 or 4 days ago. Has felt increasingly disoriented so went to the ED. CT Head showed L lateral ventricle temporal bleed so was transferred for neurosurgery services.     Hospital Course/Significant events:  7/15/25 - Admit to ICU    24 Hour Interval History:  No acute overnight events. Afebrile. Output of 950mL.     Past Medical History:   Diagnosis Date    Chronic meningitis     Cognitive disorder     Diabetes mellitus     Gait disorder     GERD (gastroesophageal reflux disease)     History of lumbar surgery     HTN (hypertension)     Hydrocephalus     08/2021    Hyperlipidemia     Ventricular premature complex        Past Surgical History:   Procedure Laterality Date    BRAIN SURGERY      shunt placed 08/2021    CERVICAL SPINE SURGERY  2007    LAMINECTOMY Right 1995    LAMINECTOMY Left 1979    VENTRICULOPERITONEAL SHUNT  08/24/2021       Social History[1]        Current Outpatient Medications   Medication Instructions    allopurinoL (ZYLOPRIM) 300 mg, Oral, Daily    amLODIPine (NORVASC) 5 mg, Oral, Daily    aspirin (ECOTRIN) 81 mg, Oral, 2 times daily    co-enzyme Q-10 30 mg, Oral, 3 times daily    folic acid (FOLVITE) 800 mcg, Oral, Daily    glyBURIDE (DIABETA) 5 mg, Oral, 2 times daily with meals, 1.5 tablets (7.5 mg) morning and night; 1 tablet (5mg) at " noon    magnesium oxide (MAG-OX) 400 mg, Oral, 2 times daily    nebivoloL (BYSTOLIC) 10 mg, Oral, Daily    olmesartan (BENICAR) 40 mg, Oral, Daily    pantoprazole (PROTONIX) 80 mg, Oral, Daily    rosuvastatin (CRESTOR) 5 mg, Oral, Daily    tadalafiL (CIALIS) 10 mg, Oral, Daily PRN    testosterone cypionate (DEPOTESTOTERONE CYPIONATE) 200 mg/mL injection Intramuscular, Every 14 days       Review of patient's allergies indicates:   Allergen Reactions    Clindamycin      Other reaction(s): Unknown    Sulfa (sulfonamide antibiotics)      Other reaction(s): Unknown    Tetracycline      Other reaction(s): Unknown    Penicillin Rash    Tobramycin Rash        Current Inpatient Medications   amLODIPine  5 mg Oral Daily    levETIRAcetam  500 mg Oral BID    mupirocin   Nasal BID       Current Intravenous Infusions          ROS       Objective:       Intake/Output Summary (Last 24 hours) at 7/16/2025 0728  Last data filed at 7/15/2025 1915  Gross per 24 hour   Intake 16.84 ml   Output 950 ml   Net -933.16 ml         Vital Signs (Most Recent):  Temp: 97.9 °F (36.6 °C) (07/16/25 0400)  Pulse: 68 (07/16/25 0630)  Resp: 16 (07/16/25 0630)  BP: 134/74 (07/16/25 0625)  SpO2: 97 % (07/16/25 0630)  Body mass index is 27.12 kg/m².  Weight: 90.7 kg (200 lb) Vital Signs (24h Range):  Temp:  [97.9 °F (36.6 °C)-99.2 °F (37.3 °C)] 97.9 °F (36.6 °C)  Pulse:  [61-78] 68  Resp:  [6-27] 16  SpO2:  [91 %-98 %] 97 %  BP: (118-190)/() 134/74     Physical Exam  Vitals reviewed.   Constitutional:       General: He is not in acute distress.  Cardiovascular:      Rate and Rhythm: Normal rate and regular rhythm.   Pulmonary:      Effort: Pulmonary effort is normal. No respiratory distress.      Breath sounds: No wheezing or rales.   Abdominal:      General: Abdomen is flat.      Palpations: Abdomen is soft.   Musculoskeletal:      Right lower leg: No edema.      Left lower leg: No edema.   Neurological:      General: No focal deficit present.       Mental Status: He is alert.      Comments: Alert and oriented.  Answers some questions inappropriately.  No focal neurologic deficits.  5/5 strength and sensation intact throughout           Lines/Drains/Airways       Peripheral Intravenous Line  Duration             Peripheral IV 07/15/25 1341 20 G Left Antecubital <1 day    Peripheral IV 07/15/25 1341 20 G Left;Posterior Hand <1 day                    Significant Labs:    Lab Results   Component Value Date    WBC 10.57 07/15/2025    HGB 15.1 07/15/2025    HCT 47.4 07/15/2025    MCV 84.9 07/15/2025     07/15/2025           BMP  Lab Results   Component Value Date     07/15/2025    K 3.6 07/15/2025    CO2 25 07/15/2025    BUN 12.6 07/15/2025    CREATININE 0.75 07/15/2025    CALCIUM 8.7 (L) 07/15/2025    AGAP 9.0 07/15/2025       Significant Imaging:  I have reviewed the pertinent imaging within the past 24 hours.        Assessment/Plan:     Assessment  Intraparenchymal hemorrhage  Encephalopathy  Hypertension  T2DM      Plan  Admit to ICU  Plan to downgrade today.   Blood pressure goals less than 160/90  Keppra b.i.d. for prophylaxis  Appreciate Neurosurgery recommendations  Sliding scale insulin    DVT Prophylaxis: SCD  GI Prophylaxis: none        Ortiz Espinosa DO  Pulmonary Critical Care Medicine  [facility] - Emergency Dept  DOS: 07/16/2025            [1]   Social History  Socioeconomic History    Marital status:    Tobacco Use    Smoking status: Never    Smokeless tobacco: Never   Substance and Sexual Activity    Alcohol use: Yes    Drug use: Never     Social Drivers of Health     Financial Resource Strain: Low Risk  (7/15/2025)    Overall Financial Resource Strain (CARDIA)     Difficulty of Paying Living Expenses: Not hard at all   Food Insecurity: No Food Insecurity (7/15/2025)    Hunger Vital Sign     Worried About Running Out of Food in the Last Year: Never true     Ran Out of Food in the Last Year: Never true    Transportation Needs: No Transportation Needs (7/15/2025)    PRAPARE - Transportation     Lack of Transportation (Medical): No     Lack of Transportation (Non-Medical): No   Stress: No Stress Concern Present (7/15/2025)    [employer] - Occupational Stress Questionnaire     Feeling of Stress : Not at all   Housing Stability: Low Risk  (7/15/2025)    Housing Stability Vital Sign     Unable to Pay for Housing in the Last Year: No     Number of Times Moved in the Last Year: 0     Homeless in the Last Year: No

## 2025-07-16 NOTE — H&P
[facility] Medicine History & Physical Examination       Patient Name: Iam Montero  MRN: [id number]  Patient Class: IP- Inpatient   Admission Date: 7/15/2025   Admitting Physician: MANJIT Service   Length of Stay: 1  Attending Physician: Dr. Acosta  Primary Care Provider: Walter Paul MD  Face-to-Face encounter date: 07/16/2025  Code Status:Full  Chief Complaint: Transfer (Transfer from [facility] for neurosx/left lateral ventricle temporal bleed with 2 mm shift. GCS 14 on arrival. )      Screening for Social Drivers for health:  Patient screened for food insecurity, housing instability, transportation needs, utility difficulties, and interpersonal safety (select all that apply as identified as concern)  []Housing or Food  []Transportation Needs  []Utility Difficulties  []Interpersonal safety  [x]None      Patient information was obtained from patient, patient's family, past medical records and ER records.  ED records were reviewed in detail and documented below    HISTORY OF PRESENT ILLNESS:   the patient is a 80-year-old  male with a past medical history of diabetes, GERD, hypertension, previous hydrocephalus, status post  shunt, and hyperlipidemia who [facility]; the patient has been deemed stable for transfer out of ICU and the hospitalist have been asked to assume care as attending. Patient was seen and evaluated in ICU bed 4.  He is awake, alert, oriented to person and place converses appropriately without any complaints of headache. He is able to get OOB without any assistance; continues to have issues with STM loss, however has no visible neurological deficits. Partner present at the bedside; case discussed with ICU nurse caring for the patient.  PAST MEDICAL HISTORY:     Past Medical History:   Diagnosis Date    Chronic meningitis     Cognitive disorder     Diabetes mellitus      Gait disorder     GERD (gastroesophageal reflux disease)     History of lumbar surgery     HTN (hypertension)     Hydrocephalus     08/2021    Hyperlipidemia     Ventricular premature complex        PAST SURGICAL HISTORY:     Past Surgical History:   Procedure Laterality Date    BRAIN SURGERY      shunt placed 08/2021    CERVICAL SPINE SURGERY  2007    LAMINECTOMY Right 1995    LAMINECTOMY Left 1979    VENTRICULOPERITONEAL SHUNT  08/24/2021       ALLERGIES:   Clindamycin, Sulfa (sulfonamide antibiotics), Tetracycline, Penicillin, and Tobramycin    FAMILY HISTORY:   Reviewed and negative    SOCIAL HISTORY:     Social History     Tobacco Use    Smoking status: Never    Smokeless tobacco: Never   Substance Use Topics    Alcohol use: Yes   ; lives at home; lifelong nonsmoker    HOME MEDICATIONS:     Prior to Admission medications    Medication Sig Start Date End Date Taking? Authorizing Provider   allopurinoL (ZYLOPRIM) 300 MG tablet Take 300 mg by mouth once daily.    Provider, Historical   amLODIPine (NORVASC) 5 MG tablet Take 5 mg by mouth once daily.    Provider, Historical   aspirin (ECOTRIN) 81 MG EC tablet Take 81 mg by mouth 2 (two) times a day.    Provider, Historical   co-enzyme Q-10 30 mg capsule Take 30 mg by mouth 3 (three) times daily.    Provider, Historical   folic acid (FOLVITE) 800 MCG Tab Take 800 mcg by mouth once daily.    Provider, Historical   glyBURIDE (DIABETA) 5 MG tablet Take 5 mg by mouth 2 (two) times daily with meals. 1.5 tablets (7.5 mg) morning and night; 1 tablet (5mg) at noon    Provider, Historical   magnesium oxide (MAG-OX) 400 mg (241.3 mg magnesium) tablet Take 400 mg by mouth 2 (two) times daily.    Provider, Historical   nebivoloL (BYSTOLIC) 10 MG Tab Take 10 mg by mouth once daily.    Provider, Historical   olmesartan (BENICAR) 40 MG tablet Take 40 mg by mouth once daily.    Provider, Historical   pantoprazole (PROTONIX) 40 MG tablet Take 80 mg by mouth once daily.     Provider, Historical   rosuvastatin (CRESTOR) 5 MG tablet Take 5 mg by mouth once daily.    Provider, Historical   tadalafiL (CIALIS) 10 MG tablet Take 10 mg by mouth daily as needed for Erectile Dysfunction.    Provider, Historical   testosterone cypionate (DEPOTESTOTERONE CYPIONATE) 200 mg/mL injection Inject into the muscle every 14 (fourteen) days.    Provider, Historical       REVIEW OF SYSTEMS:   Except as documented, all other systems reviewed and negative     PHYSICAL EXAM:     VITAL SIGNS: 24 HRS MIN & MAX LAST   Temp  Min: 97.9 °F (36.6 °C)  Max: 99.2 °F (37.3 °C) 98.6 °F (37 °C)   BP  Min: 118/77  Max: 174/98 (!) 137/98   Pulse  Min: 61  Max: 78  67   Resp  Min: 6  Max: 27 (!) 23   SpO2  Min: 91 %  Max: 97 % 95 %     General appearance: Well-developed, well-nourished elderly  male sitting up in chair at the bedside, in no apparent distress.  HENT: Atraumatic head. Moist mucous membranes of oral cavity.  Eyes: Normal extraocular movements; PERRL  Neck: Supple.   Lungs: Clear to auscultation bilaterally. No wheezing present.   Heart: Regular rate and rhythm. S1 and S2 present with no murmurs/gallop/rub. No pedal edema. No JVD present.   Abdomen: Soft, non-distended, non-tender. No rebound tenderness/guarding. Bowel sounds are normal.   Extremities: No cyanosis, clubbing, or edema.  Skin: No Rash.   Neuro: Awake, alert, oriented to person and place; PERRL; follows all simple commands; speech is clear; no facial asymmetry; Motor and sensory exams grossly intact. Good tone. Muscle strength 5/5 in all 4 extremities  Psych/mental status: Appropriate mood and affect. Responds appropriately to questions.     LABS AND IMAGING:     Recent Labs   Lab 07/15/25  1344   WBC 10.57   RBC 5.58   HGB 15.1   HCT 47.4   MCV 84.9   MCH 27.1   MCHC 31.9*   RDW 17.1*      MPV 9.7       Recent Labs   Lab 07/15/25  1344 07/16/25  1009     --    K 3.6  --      --    CO2 25  --    BUN 12.6  --     CREATININE 0.75 0.78   *  --    CALCIUM 8.7*  --    ALBUMIN 3.8  --    PROT 7.2  --    ALKPHOS 95  --    ALT 23  --    AST 14  --    BILITOT 0.5  --        Microbiology Results (last 7 days)       Procedure Component Value Units Date/Time    Blood Culture [[id number]] Collected: 07/16/25 1436    Order Status: Sent Specimen: Blood Updated: 07/16/25 1442    Blood Culture [[id number]] Collected: 07/16/25 1429    Order Status: Sent Specimen: Blood Updated: 07/16/25 1441             FL Shunt Setting  Narrative: EXAMINATION:  FL SHUNT SETTING    CLINICAL HISTORY:  pre MRI;    TECHNIQUE:  Fluoroscopic spot images of the skull were obtained in lateral projection prior to and following MRI, to evaluate shunt setting.    COMPARISON:  None available.  Impression: Shunt type:  Codman Certas    Shunt performance settings    Pre-MRI:  5    Post-MRI: 5    The partially visualized shunt tubing and regional osseous structures demonstrate no evidence of acute disruption.    Exposure information:    Fluoro time: 5 seconds    Dose: 7.83 mGy    Electronically signed by: Alessia Myers  Date:    07/16/2025  Time:    15:00  MRI Brain W WO Contrast  Narrative: EXAMINATION:  MRI BRAIN W WO CONTRAST    CLINICAL HISTORY:  Nontraumatic intracerebral hemorrhage, intraventricular rule out underline lesion;    TECHNIQUE:  Multiplanar, multisequence MR images of the brain were obtained with and without administration of intravenous contrast.    COMPARISON:  CT head dated 07/16/2025    FINDINGS:  Shunt reservoir in the right posterior scalp creates streak artifact limiting regional evaluation.    There is parenchymal hemorrhage centered in the left thalamus for example measuring 1.2 x 1.5 cm in size with surrounding edema.  There is minimal thin peripheral enhancement.  There is no definite suspicious solid or nodular enhancement.  There is subacute appearing restricted diffusion and edema in the left hippocampal body.  There is a small  amount of intraventricular hemorrhage in the left lateral ventricle.  There is diffuse smooth dural thickening likely related to shunted ventricles.    Right posterior approach ventricular shunt catheter remains in place with tip over the right lateral ventricle.  There is local mass effect.  There is no midline shift or herniation.  The basal cisterns are patent.  There is mild diffuse parenchymal volume loss.  The ventricles are stable in size.  The major intracranial voids are patent.  There is trace scattered paranasal sinus mucosal thickening.  Impression: 1. Stable left hippocampal parenchymal hemorrhage with intraventricular extension.  No definite suspicious nodular enhancement.  2. Subacute appearing ischemic changes in the left hippocampal body.  3. Smooth diffuse dural thickening is likely related to shunted ventricles.    Electronically signed by: Alessia Myers  Date:    07/16/2025  Time:    14:15  CT Head Without Contrast  Narrative: Technique: CT of the head was performed without intravenous contrast with axial as well as coronal and sagittal images.    Comparison: Comparison is with study dated July 15, 2025.    Dosage Information: Automated exposure control was utilized.    Clinical history: Fu ICH, IVH (intraventricular hemorrhage).    Findings:    Hemorrhage: A ventriculostomy drain through a right parietal approach is again seen in stable position with its tip located within the frontal horn of the right lateral ventricle. There is a slight decrease in the intraventricular hemorrhage seen within the posterior and temporal horns of the left lateral ventricle with similar degree of minimal transependymal edema. No new hemorrhagic focus identified.    Bilateral subdural shallow subacute to chronic hematomas are stable.    Brain parenchyma: There is preservation of the grey white junction throughout. No acute large vessel territory infarct is identified.    Cerebellum: Unremarkable.    Vascular:  Mild atheromatous calcification of the intracranial arteries is seen.    Calvarium: No acute linear or depressed skull fracture is seen.    Maxillofacial Structures:    Paranasal sinuses: The visualized paranasal sinuses appear clear with no significant mucoperiosteal thickening or air fluid levels identified.  Impression: Impression:    1. A ventriculostomy drain through a right parietal approach is again seen in stable position with its tip located within the frontal horn of the right lateral ventricle. There is a slight decrease in the intraventricular hemorrhage seen within the posterior and temporal horns of the left lateral ventricle with similar degree of minimal transependymal edema. No new hemorrhagic focus identified.    2. Details and other findings as noted above.    No significant discrepancy with overnight report.    Electronically signed by: Eulalio Campo  Date:    07/16/2025  Time:    07:29      ASSESSMENT & PLAN:   Impression:   Left thalamic Intraparenchymal hemorrhage  Encephalopathy   History of normal pressure hydrocephalus, status post  shunt in 2021  Hypertension   Hyperlipidemia   Diabetes type 2  Possible dementia    Plan:   Continue close neurological observation  Follow neurosurgery and neurology recommendations  CBGs ac & hs with sliding scale insulin  BP parameters- <160/90  Continue to mobilize    VTE Prophylaxis: will be placed on Heparin/Lovenox/ Xarelto/ SCD for DVT prophylaxis and will be advised to be as mobile as possible and sit in a chair as tolerated    Patient condition:  Stable/Fair/Guarded/ Serious/ Critical    __________________________________________________________________________  INPATIENT LIST OF MEDICATIONS     Scheduled Meds:   amLODIPine  5 mg Oral Daily    levETIRAcetam  500 mg Oral BID    mupirocin   Nasal BID     Continuous Infusions:  PRN Meds:.  Current Facility-Administered Medications:     dextrose 50%, 12.5 g, Intravenous, PRN    dextrose 50%, 12.5 g,  Intravenous, PRN    dextrose 50%, 25 g, Intravenous, PRN    dextrose 50%, 25 g, Intravenous, PRN    glucagon (human recombinant), 1 mg, Intramuscular, PRN    glucagon (human recombinant), 1 mg, Intramuscular, PRN    glucose, 16 g, Oral, PRN    glucose, 16 g, Oral, PRN    glucose, 24 g, Oral, PRN    glucose, 24 g, Oral, PRN    hydrALAZINE, 10 mg, Intravenous, Q6H PRN    insulin aspart U-100, 0-10 Units, Subcutaneous, QID (AC + HS) PRN    labetalol, 20 mg, Intravenous, Q6H PRN    naloxone, 0.02 mg, Intravenous, PRN    sodium chloride 0.9%, 10 mL, Intravenous, Q12H PRN      I, Tash Arcos NP have reviewed and discussed the case with Dr. Acosta.   Please see the following addendum for further assessment and plan from there attending MD.    07/16/2025    ________________________________________________________________________________      ERNA Ponce   07/16/2025

## 2025-07-17 PROBLEM — I10 HYPERTENSION: Status: ACTIVE | Noted: 2025-07-17

## 2025-07-17 PROBLEM — I61.5 IVH (INTRAVENTRICULAR HEMORRHAGE): Status: ACTIVE | Noted: 2025-07-17

## 2025-07-17 LAB
ANION GAP SERPL CALC-SCNC: 8 MEQ/L
BACTERIA #/AREA URNS AUTO: ABNORMAL /HPF
BASOPHILS # BLD AUTO: 0.03 X10(3)/MCL
BASOPHILS NFR BLD AUTO: 0.3 %
BILIRUB UR QL STRIP.AUTO: NEGATIVE
BUN SERPL-MCNC: 14.5 MG/DL (ref 8.4–25.7)
CALCIUM SERPL-MCNC: 8.6 MG/DL (ref 8.8–10)
CHLORIDE SERPL-SCNC: 107 MMOL/L (ref 98–107)
CLARITY UR: ABNORMAL
CO2 SERPL-SCNC: 25 MMOL/L (ref 23–31)
COLOR UR AUTO: YELLOW
CREAT SERPL-MCNC: 0.72 MG/DL (ref 0.72–1.25)
CREAT/UREA NIT SERPL: 20
EOSINOPHIL # BLD AUTO: 0.22 X10(3)/MCL (ref 0–0.9)
EOSINOPHIL NFR BLD AUTO: 2 %
ERYTHROCYTE [DISTWIDTH] IN BLOOD BY AUTOMATED COUNT: 16.4 % (ref 11.5–17)
GFR SERPLBLD CREATININE-BSD FMLA CKD-EPI: >60 ML/MIN/1.73/M2
GLUCOSE SERPL-MCNC: 167 MG/DL (ref 82–115)
GLUCOSE UR QL STRIP: NORMAL
HCT VFR BLD AUTO: 48.7 % (ref 42–52)
HGB BLD-MCNC: 15.7 G/DL (ref 14–18)
HGB UR QL STRIP: NEGATIVE
IMM GRANULOCYTES # BLD AUTO: 0.04 X10(3)/MCL (ref 0–0.04)
IMM GRANULOCYTES NFR BLD AUTO: 0.4 %
KETONES UR QL STRIP: NEGATIVE
LEUKOCYTE ESTERASE UR QL STRIP: 500
LYMPHOCYTES # BLD AUTO: 2.9 X10(3)/MCL (ref 0.6–4.6)
LYMPHOCYTES NFR BLD AUTO: 26.7 %
MCH RBC QN AUTO: 27.5 PG (ref 27–31)
MCHC RBC AUTO-ENTMCNC: 32.2 G/DL (ref 33–36)
MCV RBC AUTO: 85.3 FL (ref 80–94)
MONOCYTES # BLD AUTO: 1.03 X10(3)/MCL (ref 0.1–1.3)
MONOCYTES NFR BLD AUTO: 9.5 %
MUCOUS THREADS URNS QL MICRO: ABNORMAL /LPF
NEUTROPHILS # BLD AUTO: 6.64 X10(3)/MCL (ref 2.1–9.2)
NEUTROPHILS NFR BLD AUTO: 61.1 %
NITRITE UR QL STRIP: ABNORMAL
NRBC BLD AUTO-RTO: 0 %
PH UR STRIP: 5.5 [PH]
PLATELET # BLD AUTO: 261 X10(3)/MCL (ref 130–400)
PMV BLD AUTO: 10 FL (ref 7.4–10.4)
POCT GLUCOSE: 188 MG/DL (ref 70–110)
POCT GLUCOSE: 217 MG/DL (ref 70–110)
POCT GLUCOSE: 229 MG/DL (ref 70–110)
POTASSIUM SERPL-SCNC: 4 MMOL/L (ref 3.5–5.1)
PROT UR QL STRIP: ABNORMAL
RBC # BLD AUTO: 5.71 X10(6)/MCL (ref 4.7–6.1)
RBC #/AREA URNS AUTO: ABNORMAL /HPF
SODIUM SERPL-SCNC: 140 MMOL/L (ref 136–145)
SP GR UR STRIP.AUTO: 1.02 (ref 1–1.03)
SQUAMOUS #/AREA URNS LPF: ABNORMAL /HPF
TSH SERPL-ACNC: 3.62 UIU/ML (ref 0.35–4.94)
UROBILINOGEN UR STRIP-ACNC: NORMAL
WBC # BLD AUTO: 10.86 X10(3)/MCL (ref 4.5–11.5)
WBC #/AREA URNS AUTO: ABNORMAL /HPF
WBC CLUMPS UR QL AUTO: ABNORMAL

## 2025-07-17 PROCEDURE — 63600175 PHARM REV CODE 636 W HCPCS: Performed by: PSYCHIATRY & NEUROLOGY

## 2025-07-17 PROCEDURE — B3181ZZ FLUOROSCOPY OF BILATERAL INTERNAL CAROTID ARTERIES USING LOW OSMOLAR CONTRAST: ICD-10-PCS | Performed by: PSYCHIATRY & NEUROLOGY

## 2025-07-17 PROCEDURE — B3121ZZ FLUOROSCOPY OF LEFT SUBCLAVIAN ARTERY USING LOW OSMOLAR CONTRAST: ICD-10-PCS | Performed by: PSYCHIATRY & NEUROLOGY

## 2025-07-17 PROCEDURE — 84443 ASSAY THYROID STIM HORMONE: CPT

## 2025-07-17 PROCEDURE — 25500020 PHARM REV CODE 255: Performed by: PSYCHIATRY & NEUROLOGY

## 2025-07-17 PROCEDURE — 51798 US URINE CAPACITY MEASURE: CPT

## 2025-07-17 PROCEDURE — B31C1ZZ FLUOROSCOPY OF BILATERAL EXTERNAL CAROTID ARTERIES USING LOW OSMOLAR CONTRAST: ICD-10-PCS | Performed by: PSYCHIATRY & NEUROLOGY

## 2025-07-17 PROCEDURE — 81015 MICROSCOPIC EXAM OF URINE: CPT

## 2025-07-17 PROCEDURE — B3151ZZ FLUOROSCOPY OF BILATERAL COMMON CAROTID ARTERIES USING LOW OSMOLAR CONTRAST: ICD-10-PCS | Performed by: PSYCHIATRY & NEUROLOGY

## 2025-07-17 PROCEDURE — 25000003 PHARM REV CODE 250: Performed by: STUDENT IN AN ORGANIZED HEALTH CARE EDUCATION/TRAINING PROGRAM

## 2025-07-17 PROCEDURE — 63600175 PHARM REV CODE 636 W HCPCS: Performed by: STUDENT IN AN ORGANIZED HEALTH CARE EDUCATION/TRAINING PROGRAM

## 2025-07-17 PROCEDURE — 80048 BASIC METABOLIC PNL TOTAL CA: CPT

## 2025-07-17 PROCEDURE — B31F1ZZ FLUOROSCOPY OF LEFT VERTEBRAL ARTERY USING LOW OSMOLAR CONTRAST: ICD-10-PCS | Performed by: PSYCHIATRY & NEUROLOGY

## 2025-07-17 PROCEDURE — 36415 COLL VENOUS BLD VENIPUNCTURE: CPT

## 2025-07-17 PROCEDURE — 85025 COMPLETE CBC W/AUTO DIFF WBC: CPT

## 2025-07-17 PROCEDURE — 11000001 HC ACUTE MED/SURG PRIVATE ROOM

## 2025-07-17 RX ORDER — FENTANYL CITRATE 50 UG/ML
INJECTION, SOLUTION INTRAMUSCULAR; INTRAVENOUS
Status: COMPLETED | OUTPATIENT
Start: 2025-07-17 | End: 2025-07-17

## 2025-07-17 RX ORDER — IOPAMIDOL 755 MG/ML
80 INJECTION, SOLUTION INTRAVASCULAR
Status: COMPLETED | OUTPATIENT
Start: 2025-07-17 | End: 2025-07-17

## 2025-07-17 RX ORDER — LIDOCAINE HYDROCHLORIDE 20 MG/ML
INJECTION, SOLUTION INFILTRATION; PERINEURAL
Status: COMPLETED | OUTPATIENT
Start: 2025-07-17 | End: 2025-07-17

## 2025-07-17 RX ORDER — MIDAZOLAM HYDROCHLORIDE 1 MG/ML
INJECTION, SOLUTION INTRAMUSCULAR; INTRAVENOUS
Status: COMPLETED | OUTPATIENT
Start: 2025-07-17 | End: 2025-07-17

## 2025-07-17 RX ADMIN — INSULIN ASPART 4 UNITS: 100 INJECTION, SOLUTION INTRAVENOUS; SUBCUTANEOUS at 05:07

## 2025-07-17 RX ADMIN — MUPIROCIN: 20 OINTMENT TOPICAL at 09:07

## 2025-07-17 RX ADMIN — LEVETIRACETAM 500 MG: 500 TABLET, FILM COATED ORAL at 09:07

## 2025-07-17 RX ADMIN — FENTANYL CITRATE 25 MCG: 50 INJECTION, SOLUTION INTRAMUSCULAR; INTRAVENOUS at 11:07

## 2025-07-17 RX ADMIN — FENTANYL CITRATE 50 MCG: 50 INJECTION, SOLUTION INTRAMUSCULAR; INTRAVENOUS at 11:07

## 2025-07-17 RX ADMIN — IOPAMIDOL 80 ML: 755 INJECTION, SOLUTION INTRAVENOUS at 12:07

## 2025-07-17 RX ADMIN — MIDAZOLAM 1 MG: 1 INJECTION INTRAMUSCULAR; INTRAVENOUS at 11:07

## 2025-07-17 RX ADMIN — AMLODIPINE BESYLATE 5 MG: 5 TABLET ORAL at 09:07

## 2025-07-17 RX ADMIN — LIDOCAINE HYDROCHLORIDE 7 ML: 20 INJECTION, SOLUTION INFILTRATION; PERINEURAL at 11:07

## 2025-07-17 RX ADMIN — INSULIN ASPART 2 UNITS: 100 INJECTION, SOLUTION INTRAVENOUS; SUBCUTANEOUS at 09:07

## 2025-07-17 NOTE — ASSESSMENT & PLAN NOTE
- presented with confusion, fatigue, memory issues, word finding difficulty, increased disorientation  7/15-- presented to ED as a transfer from an outlying facility for a left lateral ventricle temporal bleed     Imaging:  -CTh: A ventriculostomy drain through a right parietal approach is again seen in stable position with its tip located within the frontal horn of the right lateral ventricle. There is a slight decrease in the intraventricular hemorrhage seen within the posterior and temporal horns of the left lateral ventricle with similar degree of minimal transependymal edema. No new hemorrhagic focus identified.   CTA head/neck: 1. No significant short interval change of previously seen left temporal intraparenchymal hemorrhage.  2. Intracranial atherosclerotic changes without evidence of large vessel occlusion.  3. Mild scattered atherosclerotic changes of the cervical vasculature without flow limiting stenosis.    MRI brain w/ w/o: 1. Stable left hippocampal parenchymal hemorrhage with intraventricular extension.  No definite suspicious nodular enhancement.  2. Subacute appearing ischemic changes in the left hippocampal body.  3. Smooth diffuse dural thickening is likely related to shunted ventricles.    Plan:  -NSGY following and has requested interventional neuro consult for possible DSA given atypical nature of the bleeding  -patient NPO for possible procedure  -MD discussed risks/benefits of diagnostic cerebral angiogram (DSA) with patient and significant other at bedside  -will proceed with DSA later today per MD  -other recommendations per primary and neurosurgery teams     Further recs post DSA per MD.

## 2025-07-17 NOTE — OP NOTE
Procedure:  Intraparenchymal hemorrhage.  Memory loss with right ventriculoperitoneal shunt.  Cerebral angiogram to rule out underlying lesions for intraparenchymal hemorrhage.       :  Alan Patel MD.      Date of procedure:  07/17/2025.       Indication:  Left temporal intraparenchymal hemorrhage.  Cerebral angiogram requested for ruling out underlying lesions.       Consent: After discussing all risks, benefits and alternative management strategies; both written and verbal consent were taken from the daughter Patrica to proceed.  All risks including but not limited to death, stroke, cerebral edema, seizures, cerebral/systemic hemorrhage, arterial dissections or perforations, need for stenting or ballooning, arterial occlusions, groin hematomas, grind infections, medication allergies and radiation injury were discussed.       Sedation:  Conscious sedation.  The IR nurse administered fentanyl 75 mcg IV and Versed 1.5 mg IV under continuous hemodynamic monitoring with my direct supervision.  Adequate sedation was achieved.     Fluoroscopy time:  11.9 minutes.     Fluoroscopy dose:  101 Gy per cm2     Procedure:      Right common femoral angiogram      Right common, internal and cerebral angiogram      Left common, internal and cerebral angiogram      Right vertebral and cerebral angiogram         The patient was brought to the angiography suite and placed on the radiography table.  Both groins were shaved and prepped in the usual sterile fashion.   The patient was covered with a sterile sheet. After identifying the patient, procedure and side, conscious sedation was administered as described above .       Access:  The right femoral head was identified under fluoroscopy.  The right common femoral artery was identified using ultrasound.  8 cc of buffered lidocaine was given subcutaneously and intradermally for local anesthesia.  A  21 gauge 3-1/2 inch needle was introduced with manual palpation into  the right common femoral artery.  A 0.014 microwire was introduced and the needle was removed.  A 4 Polish 10 cm micro sheath was then inserted into the right common femoral artery using the Seldinger technique.  The trocar and microwire was removed.  A 0.035 J wire was introduced into the right common femoral artery and the micro sheath was removed.  A 6 Polish 10 cm sheath was then inserted over the J-wire into the right common femoral artery and secured to the right groin using 2-0 Ethilon sutures.  The J-wire was removed from the body of the patient.     Right common, internal and cerebral angiogram:  Using a 5 Polish 100 cm/135 cm Novak 2 catheter loaded over a 0.035 glidewire, the  system was introduced into the right common femoral artery.  The Glidewire and catheter were advanced into the aortic arch.  The catheter was manipulated to engage the  brachiocephalic  artery and  the glidewire was advanced into the distal right common femoral artery.  The catheter was advanced over it and the Glidewire was removed.   The catheter was attached to an injector device.  Cerebral angiogram was done in the anteroposterior, lateral and oblique projections which showed:     Right common carotid artery:   Unremarkable.  Smooth lumen without any flow-limiting lesions.     Right carotid bifurcation:  Unremarkable.  Non flow-limiting atherosclerosis seen at the origin.     Right external carotid artery: Unremarkable.  All major branches identified.       Right internal carotid artery:  Unremarkable.  Normal cervical, petrous and cavernous segments seen.     Intracranial circulation:  The  supraclinoid internal carotid artery was seen to bifurcate into normal-appearing  anterior and  middle cerebral arteries.  The secondary and tertiary branches appear unremarkable.  A small ophthalmic artery seen traversing anteriorly,  ending in a retinal blush. The tissue and venous phase of the angiogram were unremarkable.  No aneurysms,  dissection flaps, arteriovenous malformation/fistula, dural arterial fistula, tumor blush is or other arterial venous abnormality seen.     Selective right internal carotid angiogram:  Using road mapping technique, the Novak 2 glide catheter was advanced into the internal carotid artery at its position confirmed.  Cerebral angiography done from it in the anteroposterior, lateral and oblique projections confirmed findings noted above.      The catheter was withdrawn into the aortic arch.     Left common, internal and cerebral angiogram:   the 5 Irish 100 cm Novak 2 glide catheter was introduced into the artery and  the glidewire was advanced into the distal  left common  carotid artery.  The catheter was advanced over it and the Glidewire was removed.   The catheter was attached to an I injector device.  Cerebral angiogram was done in the anteroposterior, lateral and oblique projections which showed:     Left common carotid artery:   Unremarkable.  Smooth lumen without any flow-limiting lesions.      Left carotid bifurcation:  Unremarkable.  No flow-limiting lesions seen.        Left external carotid artery: Unremarkable.  All major branches identified.       Left internal carotid artery:  Unremarkable.  Normal cervical, petrous and cavernous segments seen. A posterior communicating artery was seen arising from the cavernous segment traversing retrogradely in supplying the ipsilateral occipital lobe.     Intracranial circulation:  The  supraclinoid internal carotid artery we as seen to bifurcate into normal-appearing  anterior and  middle cerebral arteries. The secondary and tertiary branches appear unremarkable.  A small ophthalmic artery seen traversing anteriorly,  ending in a retinal blush. The tissue and venous phase of the angiogram were unremarkable.  No aneurysms, dissection flaps, arteriovenous malformation/fistula, dural arterial fistula, tumor blush is or other arterial venous abnormality seen.      Selective left internal carotid angiogram:  Using road mapping technique, the Novak 2 glide catheter was advanced into the internal carotid artery  and its position confirmed. Cerebral angiography done from it in the anteroposterior, lateral and oblique projections confirmed findings  noted above.      The catheter was withdrawn into the aortic arch.       Left vertebral and cerebral angiogram:  The Novak 2 glide catheter was manipulated to engage the left subclavian artery and the Glidewire was advanced into it.  The catheter was manipulated to engage the ostium of the left vertebral artery and the  Glidewire was removed.  Angiographic runs done in the AP, lateral and oblique views showed:     Vertebral  artery segments:  Unremarkable V1, V2 and V3 segments were seen with no flow limitation or dissection.  The intracranial V4 segment was unremarkable.  A posterior inferior cerebellar artery was seen arising from its supplying the  posterior portions of the  inferior cerebellar lobe.     Vertebrobasilar junction:  Unremarkable.  No flow-limiting lesion seen.     Basilar artery and branches:  Unremarkable.  Smooth appearing basilar artery with terminal pontine branches and bilateral anterior inferior cerebellar arteries seen arising from it and  supply ipsilateral structures.  Terminally, both superior cerebellar arteries are seen arising from the basilar artery, supplying the ipsilateral superior cerebellar lobes.  Both posterior cerebral arteries were well visualized with the secondary and tertiary branches.  The tissue and venous phases of the angiogram is unremarkable.No aneurysms, dissection flaps, arteriovenous malformation/fistula, dural arterial fistula, tumor blush is or other arterial venous abnormality seen.       At this time all wires and catheters were removed from the body of the patient.  A brief neurological examination showed intact motor, sensory, visual, speech and auditory function.      Complications: None.  The patient tolerated the procedure well.  A brief neurological examination done at the end of the procedure showed normal vision in both eyes, normal motor movements in all 4 extremities and normal sensations.  Patient had normal comprehension and speech.     Closure:  Right common femoral angiogram done through the 6 Slovak 10 cm sheath showed adequate arteriotomy of the common femoral artery above its bifurcation into the superficial femoral and   profunda femoris branches. This was deemed adequate for placement of a closure device.  A 6 Slovak Angio-Seal device was deployed after removing the 6 Slovak sheath over a 0.035 J-wire, with adequate hemostasis.     Final report:      Cerebral angiogram showing normal intracranial circulation in the anterior and posterior lobes without evidence of any arteriovenous malformation or fistula, dural arteriovenous malformations, vasculitides, aneurysms, dural venous malformations or other vascular anomalies to explain the intraparenchymal hemorrhage.      Recommendations:     Stable for discharge tomorrow.

## 2025-07-17 NOTE — PROGRESS NOTES
[facility] MEDICINE ~ PROGRESS NOTE        CHIEF COMPLAINT   Hospital follow up    HOSPITAL COURSE   80-year-old  male with a past medical history of diabetes, GERD, hypertension, previous hydrocephalus, status post  shunt, and hyperlipidemia who was received as a transfer from an outlying hospital for a left lateral ventricle temporal bleed; the patient has been deemed stable for transfer out of ICU and the hospitalist have been asked to assume care as attending. Patient was seen and evaluated in ICU bed 4.  He is awake, alert, oriented to person and place converses appropriately without any complaints of headache. He is able to get OOB without any assistance; continues to have issues with STM loss, however has no visible neurological deficits. Partner present at the bedside; case discussed with ICU nurse caring for the patient.     Today  Seen and examined this morning.  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  Appears to have some short-term memory loss currently.  He does not grasp the need for cerebral angiogram.  Discussed interventional neurologist to speak with him.        OBJECTIVE/PHYSICAL EXAM     VITAL SIGNS (MOST RECENT):  Temp: 97.4 °F (36.3 °C) (07/17/25 0736)  Pulse: 63 (07/17/25 0736)  Resp: 17 (07/16/25 2000)  BP: 133/75 (07/17/25 0736)  SpO2: 95 % (07/17/25 0736) VITAL SIGNS (24 HOUR RANGE):  Temp:  [97.3 °F (36.3 °C)-98.6 °F (37 °C)] 97.4 °F (36.3 °C)  Pulse:  [60-72] 63  Resp:  [17-24] 17  SpO2:  [94 %-96 %] 95 %  BP: (122-155)/(71-98) 133/75   GENERAL: In no acute distress, afebrile  HEENT:  CHEST: Clear to auscultation bilaterally  HEART: S1, S2, no appreciable murmur  ABDOMEN: Soft, nontender, BS +  MSK: Warm, no lower extremity edema, no clubbing or cyanosis  NEUROLOGIC:  Alert and oriented but short-term memory loss is evident, good strength in all 4 extremities   INTEGUMENTARY:  PSYCHIATRY:        ASSESSMENT/PLAN    Intraventricular hemorrhage  Acute encephalopathy versus underlying dementia  Hypertensive urgency     History of: HTN, HLD, dm 2, GERD, back 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  Recommends outpatient follow up with Dr. Henriquez for dementia evaluation.  Interventional Neurology following.  Pending further discussions regarding angiogram.  Neurosurgery signed off.  Recommends repeat MRI brain with and without contrast in 6 to 8 weeks.  Recommended angiogram given atypical location of the bleed.    DVT prophylaxis:  Not safe at this time    Anticipated discharge and disposition:   __________________________________________________________________________    NUTRITIONAL STATUS     Patient meets ASPEN criteria for   malnutrition of   per RD assessment as evidenced by:                       A minimum of two characteristics is recommended for diagnosis of either severe or non-severe malnutrition.     LABS/MICRO/MEDS/DIAGNOSTICS       LABS  Recent Labs     07/15/25  1344 07/16/25  1009 07/17/25  0351     --  140   K 3.6  --  4.0   CO2 25  --  25   BUN 12.6  --  14.5   CREATININE 0.75   < > 0.72   CALCIUM 8.7*  --  8.6*   ALKPHOS 95  --   --    AST 14  --   --    ALT 23  --   --    ALBUMIN 3.8  --   --     < > = values in this interval not displayed.     Recent Labs     07/17/25  0351   WBC 10.86   RBC 5.71   HCT 48.7   MCV 85.3          MICROBIOLOGY  Microbiology Results (last 7 days)       Procedure Component Value Units Date/Time    Blood Culture [[id number]] Collected: 07/16/25 1436    Order Status: Resulted Specimen: Blood Updated: 07/16/25 1442    Blood Culture [[id number]] Collected: 07/16/25 1429    Order Status: Resulted Specimen: Blood Updated: 07/16/25 1441               MEDICATIONS   amLODIPine  5 mg Oral Daily    levETIRAcetam  500 mg Oral BID    mupirocin   Nasal BID         INFUSIONS         DIAGNOSTIC TESTS  FL  Shunt Setting   Final Result      Shunt type:  Codman Certas      Shunt performance settings      Pre-MRI:  5      Post-MRI: 5      The partially visualized shunt tubing and regional osseous structures demonstrate no evidence of acute disruption.      Exposure information:      Fluoro time: 5 seconds      Dose: 7.83 mGy         Electronically signed by: Alessia Myers   Date:    07/16/2025   Time:    15:00      MRI Brain W WO Contrast   Final Result      1. Stable left hippocampal parenchymal hemorrhage with intraventricular extension.  No definite suspicious nodular enhancement.   2. Subacute appearing ischemic changes in the left hippocampal body.   3. Smooth diffuse dural thickening is likely related to shunted ventricles.         Electronically signed by: Alessia Myers   Date:    07/16/2025   Time:    14:15      CT Head Without Contrast   Final Result   Impression:      1. A ventriculostomy drain through a right parietal approach is again seen in stable position with its tip located within the frontal horn of the right lateral ventricle. There is a slight decrease in the intraventricular hemorrhage seen within the posterior and temporal horns of the left lateral ventricle with similar degree of minimal transependymal edema. No new hemorrhagic focus identified.      2. Details and other findings as noted above.      No significant discrepancy with overnight report.         Electronically signed by: Eulalio Capmo   Date:    07/16/2025   Time:    07:29      CTA Head and Neck (xpd)   Final Result      CT Previous Head   Final Result      Xray Previous Chest   Final Result      CT Previous Head   Final Result      IR Angiogram Carotid Cerebral Bilateral    (Results Pending)        No echocardiogram results found for the past 14 days.         Case related differential diagnoses have been reviewed; assessment and plan has been documented. I have personally reviewed the labs and test results that are currently  available; I have reviewed the patients medication list. I have reviewed the consulting providers recommendations. I have reviewed or attempted to review medical records based upon their availability.  All of the patient's and/or family's questions have been addressed and answered to the best of my ability.  I will continue to monitor closely and make adjustments to medical management as needed.  This document was created using M*Modal Fluency Direct.  Transcription errors may have been made.  Please contact me if any questions may rise regarding documentation to clarify transcription.        Derek Orr MD   Internal Medicine  Department of Hospital Medicine [facility] - Neurology

## 2025-07-17 NOTE — PROGRESS NOTES
[facility] - Neurology  Neurology  Progress Note    Patient Name: Iam Montero  MRN: [id number]  Admission Date: 7/15/2025  Hospital Length of Stay: 2 days  Code Status: Full Code   Attending Provider: Tera Ramirez MD  Primary Care Physician: Walter Paul MD   Principal Problem:<principal problem not specified>    Overview/Hospital Course:  7/15: transferred from [facility] facility for neurosurgery services d/t L lateral ventricle temporal bleed. No plans for surgical intervention.   CTA h/n 1. No significant short interval change of previously seen left temporal intraparenchymal hemorrhage.  2. Intracranial atherosclerotic changes without evidence of large vessel occlusion.  3. Mild scattered atherosclerotic changes of the cervical vasculature without flow limiting stenosis.    7/16: Interventional neurology team consulted for DSA per NSGY given the atypical nature of the IPH.     Subjective:     Interval History: No acute events overnight. Patient is NPO and sitting in bedside chair without acute distress. Significant other returns to the room for discussion with Dr. Patel regarding planned procedure for today. MD discussed with patient and S/O risks/benefits of diagnostic cerebral angiogram in detail. They have ultimately decided to proceed with the DSA today. All questions were answered by MD and consents were signed with myself as a witness.     Current Neurological Medications:      Current Medications[1]    Review of Systems   Eyes:  Negative for photophobia and visual disturbance.   Gastrointestinal:  Negative for nausea and vomiting.   Musculoskeletal:  Negative for neck pain and neck stiffness.   Neurological:  Negative for dizziness, tremors, seizures, syncope, facial asymmetry, speech difficulty, weakness, light-headedness, numbness and headaches.   Psychiatric/Behavioral:  Positive for confusion (some mild memory issues). Negative for agitation and behavioral problems.       Objective:     Vital Signs (Most Recent):  Temp: 97.4 °F (36.3 °C) (07/17/25 0736)  Pulse: 63 (07/17/25 0736)  Resp: 17 (07/16/25 2000)  BP: 133/75 (07/17/25 0736)  SpO2: 95 % (07/17/25 0736) Vital Signs (24h Range):  Temp:  [97.3 °F (36.3 °C)-98.6 °F (37 °C)] 97.4 °F (36.3 °C)  Pulse:  [60-72] 63  Resp:  [17-23] 17  SpO2:  [94 %-96 %] 95 %  BP: (123-155)/(72-98) 133/75     Weight: 90.7 kg (200 lb)  Body mass index is 26.39 kg/m².     Physical Exam  Vitals and nursing note reviewed.   Constitutional:       General: He is not in acute distress.     Appearance: Normal appearance. He is normal weight. He is not ill-appearing or toxic-appearing.   HENT:      Head: Normocephalic and atraumatic.      Mouth/Throat:      Mouth: Mucous membranes are moist.   Eyes:      General: No visual field deficit.     Extraocular Movements: Extraocular movements intact.      Pupils: Pupils are equal, round, and reactive to light.   Pulmonary:      Effort: Pulmonary effort is normal. No respiratory distress.   Musculoskeletal:         General: Normal range of motion.      Right lower leg: No edema.      Left lower leg: No edema.   Skin:     General: Skin is warm and dry.      Capillary Refill: Capillary refill takes less than 2 seconds.      Findings: No lesion.   Neurological:      General: No focal deficit present.      Mental Status: He is alert and oriented to person, place, and time.      GCS: GCS eye subscore is 4. GCS verbal subscore is 5. GCS motor subscore is 6.      Cranial Nerves: No cranial nerve deficit, dysarthria or facial asymmetry.      Sensory: No sensory deficit.      Motor: No weakness, tremor, atrophy, abnormal muscle tone, seizure activity or pronator drift.      Comments:     Oriented x3   But overall some mild confusion noted during conversation with the patient      Psychiatric:         Mood and Affect: Mood normal.         Behavior: Behavior normal.          NEUROLOGICAL EXAMINATION:     MENTAL STATUS    Oriented to person, place, and time.     CRANIAL NERVES     CN III, IV, VI   Pupils are equal, round, and reactive to light.      Significant Labs: CBC:   Recent Labs   Lab 07/15/25  1344 07/17/25  0351   WBC 10.57 10.86   HGB 15.1 15.7   HCT 47.4 48.7    261     CMP:   Recent Labs   Lab 07/15/25  1344 07/16/25  1009 07/17/25  0351   *  --  167*     --  140   K 3.6  --  4.0     --  107   CO2 25  --  25   BUN 12.6  --  14.5   CREATININE 0.75 0.78 0.72   CALCIUM 8.7*  --  8.6*   PROT 7.2  --   --    ALBUMIN 3.8  --   --    BILITOT 0.5  --   --    ALKPHOS 95  --   --    AST 14  --   --    ALT 23  --   --      All pertinent lab results from the past 24 hours have been reviewed.    Significant Imaging: I have reviewed all pertinent imaging results/findings within the past 24 hours.    Assessment and Plan:     Intraparenchymal hemorrhage of brain  - presented with confusion, fatigue, memory issues, word finding difficulty, increased disorientation  7/15-- presented to ED as a transfer from an outlying facility with left lateral ventricle temporal bleed     Imaging:  -CTh: A ventriculostomy drain through a right parietal approach is again seen in stable position with its tip located within the frontal horn of the right lateral ventricle. There is a slight decrease in the intraventricular hemorrhage seen within the posterior and temporal horns of the left lateral ventricle with similar degree of minimal transependymal edema. No new hemorrhagic focus identified.   CTA head/neck: 1. No significant short interval change of previously seen left temporal intraparenchymal hemorrhage.  2. Intracranial atherosclerotic changes without evidence of large vessel occlusion.  3. Mild scattered atherosclerotic changes of the cervical vasculature without flow limiting stenosis.    MRI brain w/ w/o: 1. Stable left hippocampal parenchymal hemorrhage with intraventricular extension.  No definite suspicious nodular  enhancement.  2. Subacute appearing ischemic changes in the left hippocampal body.  3. Smooth diffuse dural thickening is likely related to shunted ventricles.    Plan:  -NSGY following and has requested interventional neuro consult for possible DSA given atypical nature of the bleeding  -patient NPO for possible procedure  -MD discussed risks/benefits of diagnostic cerebral angiogram (DSA) with patient and significant other at bedside  -will proceed with DSA later today per MD  -other recommendations per primary and neurosurgery teams     Further recs post DSA per MD.       Jordyn Caicedo, [facility]  Neurology  [facility] - Neurology       [1]   Current Facility-Administered Medications   Medication Dose Route Frequency Provider Last Rate Last Admin    amLODIPine tablet 5 mg  5 mg Oral Daily Tyler Nuñez MD   5 mg at 07/17/25 0951    dextrose 50% injection 12.5 g  12.5 g Intravenous PRN Tyler Nuñez MD        dextrose 50% injection 12.5 g  12.5 g Intravenous PRN Villa Acosta MD        dextrose 50% injection 25 g  25 g Intravenous PRN Tyler Nuñez MD        dextrose 50% injection 25 g  25 g Intravenous PRN Villa Acosta MD        glucagon (human recombinant) injection 1 mg  1 mg Intramuscular PRN Tyler Nuñez MD        glucagon (human recombinant) injection 1 mg  1 mg Intramuscular PRN Villa Acosta MD        glucose chewable tablet 16 g  16 g Oral PRN Tyler Nuñez MD        glucose chewable tablet 16 g  16 g Oral PRN Villa Acosta MD        glucose chewable tablet 24 g  24 g Oral PRN Tyler Nuñez MD        glucose chewable tablet 24 g  24 g Oral PRN Villa Acosta MD        hydrALAZINE injection 10 mg  10 mg Intravenous Q6H PRN Sweta Espinosa MD   10 mg at 07/15/25 1735    insulin aspart U-100 injection 0-10 Units  0-10 Units Subcutaneous QID (AC + HS) PRN Tyler Nuñez MD   2 Units at 07/16/25 1219    labetaloL injection 20 mg  20 mg  Intravenous Q6H PRN Sweta Espinosa MD        levETIRAcetam tablet 500 mg  500 mg Oral BID Brown Wild MD   500 mg at 07/17/25 0951    mupirocin 2 % ointment   Nasal BID Tyler Nuñez MD   Given at 07/17/25 0952    naloxone 0.4 mg/mL injection 0.02 mg  0.02 mg Intravenous PRN Villa Acosta MD        sodium chloride 0.9% flush 10 mL  10 mL Intravenous Q12H PRN Villa Acosta MD

## 2025-07-17 NOTE — SUBJECTIVE & OBJECTIVE
Subjective:     Interval History: No acute events overnight. Patient is NPO and sitting in bedside chair without acute distress. Significant other returns to the room for discussion with Dr. Patel regarding planned procedure for today. MD discussed with patient and S/O risks/benefits of diagnostic cerebral angiogram in detail. They have ultimately decided to proceed with the DSA today. All questions were answered by MD and consents were signed with myself as a witness.     Current Neurological Medications:      Current Medications[1]    Review of Systems   Eyes:  Negative for photophobia and visual disturbance.   Gastrointestinal:  Negative for nausea and vomiting.   Musculoskeletal:  Negative for neck pain and neck stiffness.   Neurological:  Negative for dizziness, tremors, seizures, syncope, facial asymmetry, speech difficulty, weakness, light-headedness, numbness and headaches.   Psychiatric/Behavioral:  Positive for confusion (some mild memory issues). Negative for agitation and behavioral problems.      Objective:     Vital Signs (Most Recent):  Temp: 97.4 °F (36.3 °C) (07/17/25 0736)  Pulse: 63 (07/17/25 0736)  Resp: 17 (07/16/25 2000)  BP: 133/75 (07/17/25 0736)  SpO2: 95 % (07/17/25 0736) Vital Signs (24h Range):  Temp:  [97.3 °F (36.3 °C)-98.6 °F (37 °C)] 97.4 °F (36.3 °C)  Pulse:  [60-72] 63  Resp:  [17-23] 17  SpO2:  [94 %-96 %] 95 %  BP: (123-155)/(72-98) 133/75     Weight: 90.7 kg (200 lb)  Body mass index is 26.39 kg/m².     Physical Exam  Vitals and nursing note reviewed.   Constitutional:       General: He is not in acute distress.     Appearance: Normal appearance. He is normal weight. He is not ill-appearing or toxic-appearing.   HENT:      Head: Normocephalic and atraumatic.      Mouth/Throat:      Mouth: Mucous membranes are moist.   Eyes:      General: No visual field deficit.     Extraocular Movements: Extraocular movements intact.      Pupils: Pupils are equal, round, and reactive to  light.   Pulmonary:      Effort: Pulmonary effort is normal. No respiratory distress.   Musculoskeletal:         General: Normal range of motion.      Right lower leg: No edema.      Left lower leg: No edema.   Skin:     General: Skin is warm and dry.      Capillary Refill: Capillary refill takes less than 2 seconds.      Findings: No lesion.   Neurological:      General: No focal deficit present.      Mental Status: He is alert and oriented to person, place, and time.      GCS: GCS eye subscore is 4. GCS verbal subscore is 5. GCS motor subscore is 6.      Cranial Nerves: No cranial nerve deficit, dysarthria or facial asymmetry.      Sensory: No sensory deficit.      Motor: No weakness, tremor, atrophy, abnormal muscle tone, seizure activity or pronator drift.      Comments:     Oriented x3   But overall some mild confusion noted during conversation with the patient      Psychiatric:         Mood and Affect: Mood normal.         Behavior: Behavior normal.          NEUROLOGICAL EXAMINATION:     MENTAL STATUS   Oriented to person, place, and time.     CRANIAL NERVES     CN III, IV, VI   Pupils are equal, round, and reactive to light.      Significant Labs: CBC:   Recent Labs   Lab 07/15/25  1344 07/17/25  0351   WBC 10.57 10.86   HGB 15.1 15.7   HCT 47.4 48.7    261     CMP:   Recent Labs   Lab 07/15/25  1344 07/16/25  1009 07/17/25  0351   *  --  167*     --  140   K 3.6  --  4.0     --  107   CO2 25  --  25   BUN 12.6  --  14.5   CREATININE 0.75 0.78 0.72   CALCIUM 8.7*  --  8.6*   PROT 7.2  --   --    ALBUMIN 3.8  --   --    BILITOT 0.5  --   --    ALKPHOS 95  --   --    AST 14  --   --    ALT 23  --   --      All pertinent lab results from the past 24 hours have been reviewed.    Significant Imaging: I have reviewed all pertinent imaging results/findings within the past 24 hours.       [1]   Current Facility-Administered Medications   Medication Dose Route Frequency Provider Last Rate  Last Admin    amLODIPine tablet 5 mg  5 mg Oral Daily Tyler Nuñez MD   5 mg at 07/17/25 0951    dextrose 50% injection 12.5 g  12.5 g Intravenous PRN Tyler Nuñez MD        dextrose 50% injection 12.5 g  12.5 g Intravenous PRN Villa Acosta MD        dextrose 50% injection 25 g  25 g Intravenous PRN Tyler Nuñez MD        dextrose 50% injection 25 g  25 g Intravenous PRN Villa Acosta MD        glucagon (human recombinant) injection 1 mg  1 mg Intramuscular PRN Tyler Nuñez MD        glucagon (human recombinant) injection 1 mg  1 mg Intramuscular PRN Villa Acosta MD        glucose chewable tablet 16 g  16 g Oral PRN Tyler Nuñez MD        glucose chewable tablet 16 g  16 g Oral PRN Villa Acosta MD        glucose chewable tablet 24 g  24 g Oral PRN Tyler Nuñez MD        glucose chewable tablet 24 g  24 g Oral PRN Villa Acosta MD        hydrALAZINE injection 10 mg  10 mg Intravenous Q6H PRN Sweta Espinosa MD   10 mg at 07/15/25 1735    insulin aspart U-100 injection 0-10 Units  0-10 Units Subcutaneous QID (AC + HS) PRN Tyler Nuñez MD   2 Units at 07/16/25 1219    labetaloL injection 20 mg  20 mg Intravenous Q6H PRN Sweta Espinosa MD        levETIRAcetam tablet 500 mg  500 mg Oral BID Brown Wild MD   500 mg at 07/17/25 0951    mupirocin 2 % ointment   Nasal BID Tyler Nuñez MD   Given at 07/17/25 0952    naloxone 0.4 mg/mL injection 0.02 mg  0.02 mg Intravenous PRN Villa Acosta MD        sodium chloride 0.9% flush 10 mL  10 mL Intravenous Q12H PRN Villa Acosta MD

## 2025-07-17 NOTE — HOSPITAL COURSE
7/15: transferred from [facility] for neurosurgery services d/t L lateral ventricle temporal bleed. No plans for surgical intervention.   CTA h/n 1. No significant short interval change of previously seen left temporal intraparenchymal hemorrhage.  2. Intracranial atherosclerotic changes without evidence of large vessel occlusion.  3. Mild scattered atherosclerotic changes of the cervical vasculature without flow limiting stenosis.    7/16: Interventional neurology team consulted for DSA per NSGY given the atypical nature of the IPH.

## 2025-07-18 ENCOUNTER — TELEPHONE (OUTPATIENT)
Dept: NEUROSURGERY | Facility: CLINIC | Age: 80
End: 2025-07-18
Payer: MEDICARE

## 2025-07-18 VITALS
DIASTOLIC BLOOD PRESSURE: 82 MMHG | HEART RATE: 72 BPM | HEIGHT: 73 IN | WEIGHT: 200 LBS | TEMPERATURE: 98 F | SYSTOLIC BLOOD PRESSURE: 135 MMHG | RESPIRATION RATE: 20 BRPM | BODY MASS INDEX: 26.51 KG/M2 | OXYGEN SATURATION: 95 %

## 2025-07-18 DIAGNOSIS — G91.2 NPH (NORMAL PRESSURE HYDROCEPHALUS): Primary | ICD-10-CM

## 2025-07-18 PROCEDURE — 25000003 PHARM REV CODE 250: Performed by: STUDENT IN AN ORGANIZED HEALTH CARE EDUCATION/TRAINING PROGRAM

## 2025-07-18 RX ORDER — LEVETIRACETAM 500 MG/1
500 TABLET ORAL 2 TIMES DAILY
Qty: 10 TABLET | Refills: 0 | Status: SHIPPED | OUTPATIENT
Start: 2025-07-18 | End: 2025-07-23

## 2025-07-18 RX ORDER — NITROFURANTOIN 25; 75 MG/1; MG/1
100 CAPSULE ORAL 2 TIMES DAILY
Qty: 10 CAPSULE | Refills: 0 | Status: SHIPPED | OUTPATIENT
Start: 2025-07-18 | End: 2025-07-23

## 2025-07-18 RX ADMIN — LEVETIRACETAM 500 MG: 500 TABLET, FILM COATED ORAL at 08:07

## 2025-07-18 RX ADMIN — MUPIROCIN: 20 OINTMENT TOPICAL at 08:07

## 2025-07-18 RX ADMIN — AMLODIPINE BESYLATE 5 MG: 5 TABLET ORAL at 08:07

## 2025-07-18 NOTE — PLAN OF CARE
07/18/25 1444   Final Note   Assessment Type Final Discharge Note   Anticipated Discharge Disposition Home   Post-Acute Status   Discharge Delays None known at this time     Patient is discharged home to self care. No therapy or home health indicated. No discharge planning needs identified at this time.

## 2025-07-18 NOTE — TELEPHONE ENCOUNTER
[facility] called to schedule patient's hosp f/u. When I looked for when and with whom patient was to be scheduled with; it only stated 6-8 wk f/u w/ repeat MRI (B) w/wo. Was that to be scheduled w/ and LUNA, Dr. Mcmahan, or Cami?  I did go ahead and schedule him w/ Denise for 09/03 at 10:00am and his MRI (B) the week before on 08/27 at 8:13am; just to get him on the books. Is this appropriate?

## 2025-07-18 NOTE — DISCHARGE SUMMARY
[facility] - Neurology HOSPITAL MEDICINE - DISCHARGE SUMMARY    Patient Name: Iam Montero  MRN: [id number]  Admission Date: 7/15/2025  Discharge Date: 07/18/2025  Hospital Length of Stay: 3 days  Discharge Provider: Derek Orr MD  Primary Care Provider: Walter Paul MD      HOSPITAL COURSE   80-year-old  male with a past medical history of diabetes, GERD, hypertension, previous hydrocephalus, status post  shunt, and hyperlipidemia [facility]; the patient has been deemed stable for transfer out of ICU and the hospitalist have been asked to assume care as attending. Patient was seen and evaluated in ICU bed 4. He is awake, alert, oriented to person and place converses appropriately without any complaints of headache. He is able to get OOB without any assistance; continues to have issues with STM loss, however has no visible neurological deficits. Partner present at the bedside; case discussed with ICU nurse caring for the patient.       Patient was evaluated by Interventional Neurology and underwent angiogram yesterday which showed normal intracranial circulation without evidence to explain the intraparenchymal hemorrhage.  He will hold his aspirin until cleared by Neurology.  Appears that he was on aspirin for primary prevention at home.  He does have significant short-term memory loss likely secondary to his hippocampal injury with MRI showing subacute appearing ischemic changes to the left hippocampal body and left hippocampal parenchymal hemorrhage.  He was seen by Dr. Mcdaniels neurologist here in the hospital recommended follow up with Dr. Henriquez as outpatient for dementia evaluation as well.  At this time urine does show some signs of UTI however he does not have symptoms.  We will go ahead and treat this given daughter's concern with Macrobid, he does have multiple allergies including  penicillin, sulfa.  NO need for therapy evaluation as he is ambulating independently.           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 for 5 days  Qty: 10 tablet, Refills: 0      nitrofurantoin, macrocrystal-monohydrate, (MACROBID) 100 MG capsule Take 1 capsule (100 mg total) by mouth 2 (two) times daily. for 5 days  Qty: 10 capsule, Refills: 0           CONTINUE these medications which have NOT CHANGED    Details   allopurinoL (ZYLOPRIM) 300 MG tablet Take 300 mg by mouth once daily.      amLODIPine (NORVASC) 5 MG tablet Take 5 mg by mouth once daily.      co-enzyme Q-10 30 mg capsule Take 30 mg by mouth 3 (three) times daily.      folic acid (FOLVITE) 800 MCG Tab Take 800 mcg by mouth once daily.      glyBURIDE (DIABETA) 5 MG tablet Take 5  mg by mouth 2 (two) times daily with meals. 1.5 tablets (7.5 mg) morning and night; 1 tablet (5mg) at noon      magnesium oxide (MAG-OX) 400 mg (241.3 mg magnesium) tablet Take 400 mg by mouth 2 (two) times daily.      nebivoloL (BYSTOLIC) 10 MG Tab Take 10 mg by mouth once daily.      olmesartan (BENICAR) 40 MG tablet Take 40 mg by mouth once daily.      pantoprazole (PROTONIX) 40 MG tablet Take 80 mg by mouth once daily.      rosuvastatin (CRESTOR) 5 MG tablet Take 5 mg by mouth once daily.      tadalafiL (CIALIS) 10 MG tablet Take 10 mg by mouth daily as needed for Erectile Dysfunction.      testosterone cypionate (DEPOTESTOTERONE CYPIONATE) 200 mg/mL injection Inject into the muscle every 14 (fourteen) days.           STOP taking these medications       aspirin (ECOTRIN) 81 MG EC tablet Comments:   Reason for Stopping:                  CONSULTS     Consults (From admission, onward)          Status Ordering Provider     Inpatient consult to Neurology Services (Vascular Neurology)  Once        Provider:  Alan Patel MD    Completed MARIELLA ALVES     Inpatient Consult to Neurology Services (General Neurology)  Once        Provider:  Octavio Dhillon MD    Completed SONU LEUNG     Inpatient consult to Neurosurgery  Once        Provider:  Brown Wild MD    Completed WALTER RODRIGUES              FOLLOW UP      Follow-up Information       William Henriquez MD. Schedule an appointment as soon as possible for a visit in 1 month(s).    Specialties: Neurology, Sleep Medicine  Why: dementia w/u  Contact information:  [address] [place] [address] Dr  [address]  [facility] [id number] [phone]               Walter Paul MD Follow up in 1 week(s).    Specialty: Family Medicine  Contact information:  [address] [place] [id number] [phone]               Brown Wild MD Follow up in 6 week(s).    Specialty: Neurosurgery  Why: with MRi brain w/ w/o contrast  Contact  information:  [address] [place] [address] [address]  [place] [address]  [phone]                                 DISCHARGE INSTRUCTIONS     Explained in detail to the patient about the discharge plan, medications, and follow-up visits. Pt understands and agrees with the treatment plan.  Discharged Condition: stable  Diet as tolerated  Activities as tolerated  Discharge to: Home or Self Care    TIME SPENT ON DISCHARGE   35 minutes        Derek Orr MD  Internal Medicine  Department of Hospital Medicine [facility]      This document was created using electronic dictation services.  Please excuse any errors that may have been made.  Contact me if any questions regarding documentation to clarify verbiage.

## 2025-07-18 NOTE — PLAN OF CARE
Problem: Adult Inpatient Plan of Care  Goal: Plan of Care Review  Outcome: Met  Goal: Patient-Specific Goal (Individualized)  Outcome: Met  Goal: Absence of Hospital-Acquired Illness or Injury  Outcome: Met  Goal: Optimal Comfort and Wellbeing  Outcome: Met  Goal: Readiness for Transition of Care  Outcome: Met     Problem: Skin Injury Risk Increased  Goal: Skin Health and Integrity  Outcome: Met     Problem: Stroke, Intracerebral Hemorrhage  Goal: Optimal Coping  Outcome: Met  Goal: Effective Bowel Elimination  Outcome: Met  Goal: Optimal Cerebral Tissue Perfusion  Outcome: Met  Goal: Optimal Cognitive Function  Outcome: Met  Goal: Effective Communication Skills  Outcome: Met  Goal: Optimal Functional Ability  Outcome: Met  Goal: Optimal Nutrition Intake  Outcome: Met  Goal: Optimal Pain Control and Function  Outcome: Met  Goal: Effective Oxygenation and Ventilation  Outcome: Met  Goal: Improved Sensorimotor Function  Outcome: Met  Goal: Safe and Effective Swallow  Outcome: Met  Goal: Effective Urinary Elimination  Outcome: Met     Problem: Wound  Goal: Optimal Coping  Outcome: Met  Goal: Optimal Functional Ability  Outcome: Met  Goal: Absence of Infection Signs and Symptoms  Outcome: Met  Goal: Improved Oral Intake  Outcome: Met  Goal: Optimal Pain Control and Function  Outcome: Met  Goal: Skin Health and Integrity  Outcome: Met  Goal: Optimal Wound Healing  Outcome: Met

## 2025-07-21 LAB
BACTERIA BLD CULT: NORMAL
BACTERIA BLD CULT: NORMAL

## 2025-08-12 ENCOUNTER — OFFICE VISIT (OUTPATIENT)
Dept: NEUROLOGY | Facility: CLINIC | Age: 80
End: 2025-08-12
Payer: MEDICARE

## 2025-08-12 ENCOUNTER — OFFICE VISIT (OUTPATIENT)
Dept: NEUROSURGERY | Facility: CLINIC | Age: 80
End: 2025-08-12
Payer: MEDICARE

## 2025-08-12 VITALS
SYSTOLIC BLOOD PRESSURE: 131 MMHG | HEIGHT: 73 IN | HEART RATE: 66 BPM | WEIGHT: 230 LBS | BODY MASS INDEX: 30.48 KG/M2 | DIASTOLIC BLOOD PRESSURE: 82 MMHG | RESPIRATION RATE: 16 BRPM

## 2025-08-12 VITALS
WEIGHT: 220 LBS | BODY MASS INDEX: 29.16 KG/M2 | DIASTOLIC BLOOD PRESSURE: 84 MMHG | SYSTOLIC BLOOD PRESSURE: 142 MMHG | HEIGHT: 73 IN

## 2025-08-12 DIAGNOSIS — R41.3 MEMORY DIFFICULTY: ICD-10-CM

## 2025-08-12 DIAGNOSIS — Z98.2 S/P VP SHUNT: ICD-10-CM

## 2025-08-12 DIAGNOSIS — I61.9 INTRAPARENCHYMAL HEMORRHAGE OF BRAIN: Primary | ICD-10-CM

## 2025-08-12 DIAGNOSIS — I61.9 HEMORRHAGIC CEREBROVASCULAR ACCIDENT (CVA): Primary | ICD-10-CM

## 2025-08-12 DIAGNOSIS — G91.2 NPH (NORMAL PRESSURE HYDROCEPHALUS): ICD-10-CM

## 2025-08-12 PROCEDURE — 99213 OFFICE O/P EST LOW 20 MIN: CPT | Mod: PBBFAC | Performed by: SPECIALIST

## 2025-08-12 PROCEDURE — 99215 OFFICE O/P EST HI 40 MIN: CPT | Mod: S$PBB,,, | Performed by: SPECIALIST

## 2025-08-12 PROCEDURE — 99999 PR PBB SHADOW E&M-EST. PATIENT-LVL III: CPT | Mod: PBBFAC,,, | Performed by: SPECIALIST

## 2025-08-12 PROCEDURE — 99215 OFFICE O/P EST HI 40 MIN: CPT | Mod: ,,, | Performed by: PHYSICIAN ASSISTANT

## 2025-08-12 RX ORDER — LEVETIRACETAM 500 MG/1
500 TABLET ORAL 2 TIMES DAILY
Qty: 60 TABLET | Refills: 11 | Status: SHIPPED | OUTPATIENT
Start: 2025-08-12 | End: 2026-08-12

## 2025-08-14 DIAGNOSIS — G91.2 NPH (NORMAL PRESSURE HYDROCEPHALUS): ICD-10-CM

## 2025-08-14 DIAGNOSIS — Z98.2 S/P VP SHUNT: Primary | ICD-10-CM

## 2025-08-18 ENCOUNTER — OFFICE VISIT (OUTPATIENT)
Dept: NEUROSURGERY | Facility: CLINIC | Age: 80
End: 2025-08-18
Payer: MEDICARE

## 2025-08-18 VITALS
SYSTOLIC BLOOD PRESSURE: 170 MMHG | BODY MASS INDEX: 30.48 KG/M2 | RESPIRATION RATE: 16 BRPM | WEIGHT: 230 LBS | DIASTOLIC BLOOD PRESSURE: 94 MMHG | HEIGHT: 73 IN | HEART RATE: 68 BPM

## 2025-08-18 DIAGNOSIS — G91.2 NPH (NORMAL PRESSURE HYDROCEPHALUS): Primary | ICD-10-CM

## 2025-08-18 DIAGNOSIS — Z98.2 S/P VP SHUNT: ICD-10-CM

## 2025-08-18 DIAGNOSIS — I61.9 HEMORRHAGIC CEREBROVASCULAR ACCIDENT (CVA): ICD-10-CM

## 2025-08-18 PROCEDURE — 99213 OFFICE O/P EST LOW 20 MIN: CPT | Mod: ,,, | Performed by: PHYSICIAN ASSISTANT

## 2025-08-18 PROCEDURE — 62252 CSF SHUNT REPROGRAM: CPT | Mod: ,,, | Performed by: PHYSICIAN ASSISTANT
